# Patient Record
Sex: MALE | Race: WHITE | NOT HISPANIC OR LATINO | Employment: FULL TIME | URBAN - METROPOLITAN AREA
[De-identification: names, ages, dates, MRNs, and addresses within clinical notes are randomized per-mention and may not be internally consistent; named-entity substitution may affect disease eponyms.]

---

## 2017-03-06 ENCOUNTER — OFFICE VISIT - HEALTHEAST (OUTPATIENT)
Dept: FAMILY MEDICINE | Facility: CLINIC | Age: 47
End: 2017-03-06

## 2017-03-06 DIAGNOSIS — M79.89 FOOT SWELLING: ICD-10-CM

## 2017-03-06 DIAGNOSIS — E66.9 OBESITY, UNSPECIFIED: ICD-10-CM

## 2017-03-08 ENCOUNTER — COMMUNICATION - HEALTHEAST (OUTPATIENT)
Dept: FAMILY MEDICINE | Facility: CLINIC | Age: 47
End: 2017-03-08

## 2017-07-27 ENCOUNTER — COMMUNICATION - HEALTHEAST (OUTPATIENT)
Dept: FAMILY MEDICINE | Facility: CLINIC | Age: 47
End: 2017-07-27

## 2017-07-27 ENCOUNTER — RECORDS - HEALTHEAST (OUTPATIENT)
Dept: ADMINISTRATIVE | Facility: OTHER | Age: 47
End: 2017-07-27

## 2017-07-31 ENCOUNTER — COMMUNICATION - HEALTHEAST (OUTPATIENT)
Dept: TELEHEALTH | Facility: CLINIC | Age: 47
End: 2017-07-31

## 2017-07-31 ENCOUNTER — HOSPITAL ENCOUNTER (OUTPATIENT)
Dept: RADIOLOGY | Facility: CLINIC | Age: 47
Discharge: HOME | End: 2017-07-31

## 2017-07-31 DIAGNOSIS — M99.05 PELVIC REGION SOMATIC DYSFUNCTION: ICD-10-CM

## 2017-07-31 DIAGNOSIS — M54.2 CERVICALGIA: ICD-10-CM

## 2017-07-31 DIAGNOSIS — M99.02 SEGMENTAL AND SOMATIC DYSFUNCTION OF THORACIC REGION: ICD-10-CM

## 2017-07-31 DIAGNOSIS — M54.50 LUMBAGO: ICD-10-CM

## 2017-07-31 DIAGNOSIS — R51.9 FACIAL PAIN: ICD-10-CM

## 2017-07-31 DIAGNOSIS — M54.6 PAIN IN THORACIC SPINE: ICD-10-CM

## 2017-07-31 DIAGNOSIS — M99.01 CERVICAL (NECK) REGION SOMATIC DYSFUNCTION: ICD-10-CM

## 2017-07-31 DIAGNOSIS — M62.830 BACK MUSCLE SPASM: ICD-10-CM

## 2017-07-31 DIAGNOSIS — M99.03 LUMBAR REGION SOMATIC DYSFUNCTION: ICD-10-CM

## 2017-07-31 DIAGNOSIS — R42 DIZZINESS AND GIDDINESS: ICD-10-CM

## 2017-11-03 ENCOUNTER — OFFICE VISIT - HEALTHEAST (OUTPATIENT)
Dept: FAMILY MEDICINE | Facility: CLINIC | Age: 47
End: 2017-11-03

## 2017-11-03 ENCOUNTER — COMMUNICATION - HEALTHEAST (OUTPATIENT)
Dept: FAMILY MEDICINE | Facility: CLINIC | Age: 47
End: 2017-11-03

## 2017-11-03 ENCOUNTER — AMBULATORY - HEALTHEAST (OUTPATIENT)
Dept: FAMILY MEDICINE | Facility: CLINIC | Age: 47
End: 2017-11-03

## 2017-11-03 DIAGNOSIS — R73.03 PREDIABETES: ICD-10-CM

## 2017-11-03 DIAGNOSIS — M26.609 TEMPOROMANDIBULAR JOINT DISORDER: ICD-10-CM

## 2017-11-03 DIAGNOSIS — M19.90 OSTEOARTHRITIS: ICD-10-CM

## 2017-11-03 DIAGNOSIS — E78.5 HYPERLIPIDEMIA: ICD-10-CM

## 2017-11-03 DIAGNOSIS — M26.629 TMJ ARTHRALGIA: ICD-10-CM

## 2017-11-03 DIAGNOSIS — K21.00 REFLUX ESOPHAGITIS: ICD-10-CM

## 2017-11-03 LAB
CHOLEST SERPL-MCNC: 205 MG/DL
FASTING STATUS PATIENT QL REPORTED: YES
HDLC SERPL-MCNC: 34 MG/DL
LDLC SERPL CALC-MCNC: 147 MG/DL
TRIGL SERPL-MCNC: 121 MG/DL

## 2017-11-20 ENCOUNTER — AMBULATORY - HEALTHEAST (OUTPATIENT)
Dept: ADMINISTRATIVE | Facility: REHABILITATION | Age: 47
End: 2017-11-20

## 2017-11-20 ENCOUNTER — RECORDS - HEALTHEAST (OUTPATIENT)
Dept: ADMINISTRATIVE | Facility: OTHER | Age: 47
End: 2017-11-20

## 2017-11-20 ENCOUNTER — COMMUNICATION - HEALTHEAST (OUTPATIENT)
Dept: FAMILY MEDICINE | Facility: CLINIC | Age: 47
End: 2017-11-20

## 2017-11-20 DIAGNOSIS — M25.569 KNEE PAIN: ICD-10-CM

## 2017-11-20 DIAGNOSIS — G89.29 CHRONIC HEADACHES: ICD-10-CM

## 2017-11-20 DIAGNOSIS — S03.00XA TMJ (DISLOCATION OF TEMPOROMANDIBULAR JOINT): ICD-10-CM

## 2017-11-20 DIAGNOSIS — R51.9 CHRONIC HEADACHES: ICD-10-CM

## 2017-11-20 DIAGNOSIS — M17.9 KNEE OSTEOARTHRITIS: ICD-10-CM

## 2017-11-21 ENCOUNTER — RECORDS - HEALTHEAST (OUTPATIENT)
Dept: ADMINISTRATIVE | Facility: OTHER | Age: 47
End: 2017-11-21

## 2017-11-27 ENCOUNTER — OFFICE VISIT - HEALTHEAST (OUTPATIENT)
Dept: PHYSICAL THERAPY | Facility: REHABILITATION | Age: 47
End: 2017-11-27

## 2017-11-27 DIAGNOSIS — M25.561 CHRONIC PAIN OF RIGHT KNEE: ICD-10-CM

## 2017-11-27 DIAGNOSIS — G89.29 CHRONIC PAIN OF RIGHT KNEE: ICD-10-CM

## 2017-11-27 DIAGNOSIS — G44.221 CHRONIC TENSION-TYPE HEADACHE, INTRACTABLE: ICD-10-CM

## 2017-11-27 DIAGNOSIS — M26.629 TMJ PAIN DYSFUNCTION SYNDROME: ICD-10-CM

## 2017-12-07 ENCOUNTER — OFFICE VISIT - HEALTHEAST (OUTPATIENT)
Dept: PHYSICAL THERAPY | Facility: REHABILITATION | Age: 47
End: 2017-12-07

## 2017-12-07 DIAGNOSIS — M26.629 TMJ PAIN DYSFUNCTION SYNDROME: ICD-10-CM

## 2017-12-07 DIAGNOSIS — G89.29 CHRONIC PAIN OF RIGHT KNEE: ICD-10-CM

## 2017-12-07 DIAGNOSIS — M25.561 CHRONIC PAIN OF RIGHT KNEE: ICD-10-CM

## 2017-12-07 DIAGNOSIS — G44.221 CHRONIC TENSION-TYPE HEADACHE, INTRACTABLE: ICD-10-CM

## 2017-12-08 ENCOUNTER — OFFICE VISIT - HEALTHEAST (OUTPATIENT)
Dept: PHYSICAL THERAPY | Facility: REHABILITATION | Age: 47
End: 2017-12-08

## 2017-12-08 DIAGNOSIS — G44.221 CHRONIC TENSION-TYPE HEADACHE, INTRACTABLE: ICD-10-CM

## 2017-12-08 DIAGNOSIS — M25.561 CHRONIC PAIN OF RIGHT KNEE: ICD-10-CM

## 2017-12-08 DIAGNOSIS — G89.29 CHRONIC PAIN OF RIGHT KNEE: ICD-10-CM

## 2017-12-08 DIAGNOSIS — M26.629 TMJ PAIN DYSFUNCTION SYNDROME: ICD-10-CM

## 2017-12-13 ENCOUNTER — OFFICE VISIT - HEALTHEAST (OUTPATIENT)
Dept: PHYSICAL THERAPY | Facility: REHABILITATION | Age: 47
End: 2017-12-13

## 2017-12-13 DIAGNOSIS — G89.29 CHRONIC PAIN OF RIGHT KNEE: ICD-10-CM

## 2017-12-13 DIAGNOSIS — M25.561 CHRONIC PAIN OF RIGHT KNEE: ICD-10-CM

## 2017-12-13 DIAGNOSIS — G44.221 CHRONIC TENSION-TYPE HEADACHE, INTRACTABLE: ICD-10-CM

## 2017-12-13 DIAGNOSIS — M26.629 TMJ PAIN DYSFUNCTION SYNDROME: ICD-10-CM

## 2017-12-15 ENCOUNTER — OFFICE VISIT - HEALTHEAST (OUTPATIENT)
Dept: PHYSICAL THERAPY | Facility: REHABILITATION | Age: 47
End: 2017-12-15

## 2017-12-15 DIAGNOSIS — G89.29 CHRONIC PAIN OF RIGHT KNEE: ICD-10-CM

## 2017-12-15 DIAGNOSIS — G44.221 CHRONIC TENSION-TYPE HEADACHE, INTRACTABLE: ICD-10-CM

## 2017-12-15 DIAGNOSIS — M25.561 CHRONIC PAIN OF RIGHT KNEE: ICD-10-CM

## 2017-12-15 DIAGNOSIS — M26.629 TMJ PAIN DYSFUNCTION SYNDROME: ICD-10-CM

## 2017-12-21 ENCOUNTER — OFFICE VISIT - HEALTHEAST (OUTPATIENT)
Dept: PHYSICAL THERAPY | Facility: REHABILITATION | Age: 47
End: 2017-12-21

## 2017-12-21 DIAGNOSIS — G89.29 CHRONIC PAIN OF RIGHT KNEE: ICD-10-CM

## 2017-12-21 DIAGNOSIS — M26.629 TMJ PAIN DYSFUNCTION SYNDROME: ICD-10-CM

## 2017-12-21 DIAGNOSIS — G44.221 CHRONIC TENSION-TYPE HEADACHE, INTRACTABLE: ICD-10-CM

## 2017-12-21 DIAGNOSIS — M25.561 CHRONIC PAIN OF RIGHT KNEE: ICD-10-CM

## 2017-12-26 ENCOUNTER — RECORDS - HEALTHEAST (OUTPATIENT)
Dept: ADMINISTRATIVE | Facility: OTHER | Age: 47
End: 2017-12-26

## 2017-12-26 ENCOUNTER — OFFICE VISIT - HEALTHEAST (OUTPATIENT)
Dept: PHYSICAL THERAPY | Facility: REHABILITATION | Age: 47
End: 2017-12-26

## 2017-12-26 DIAGNOSIS — M26.629 TMJ PAIN DYSFUNCTION SYNDROME: ICD-10-CM

## 2017-12-26 DIAGNOSIS — G89.29 CHRONIC PAIN OF RIGHT KNEE: ICD-10-CM

## 2017-12-26 DIAGNOSIS — G44.221 CHRONIC TENSION-TYPE HEADACHE, INTRACTABLE: ICD-10-CM

## 2017-12-26 DIAGNOSIS — M25.561 CHRONIC PAIN OF RIGHT KNEE: ICD-10-CM

## 2017-12-27 ENCOUNTER — OFFICE VISIT - HEALTHEAST (OUTPATIENT)
Dept: PHYSICAL THERAPY | Facility: REHABILITATION | Age: 47
End: 2017-12-27

## 2017-12-27 DIAGNOSIS — M26.629 TMJ PAIN DYSFUNCTION SYNDROME: ICD-10-CM

## 2017-12-27 DIAGNOSIS — M25.561 CHRONIC PAIN OF RIGHT KNEE: ICD-10-CM

## 2017-12-27 DIAGNOSIS — G44.221 CHRONIC TENSION-TYPE HEADACHE, INTRACTABLE: ICD-10-CM

## 2017-12-27 DIAGNOSIS — G89.29 CHRONIC PAIN OF RIGHT KNEE: ICD-10-CM

## 2018-01-08 ENCOUNTER — OFFICE VISIT - HEALTHEAST (OUTPATIENT)
Dept: PHYSICAL THERAPY | Facility: REHABILITATION | Age: 48
End: 2018-01-08

## 2018-01-08 DIAGNOSIS — G89.29 CHRONIC PAIN OF RIGHT KNEE: ICD-10-CM

## 2018-01-08 DIAGNOSIS — M26.629 TMJ PAIN DYSFUNCTION SYNDROME: ICD-10-CM

## 2018-01-08 DIAGNOSIS — G44.221 CHRONIC TENSION-TYPE HEADACHE, INTRACTABLE: ICD-10-CM

## 2018-01-08 DIAGNOSIS — M25.561 CHRONIC PAIN OF RIGHT KNEE: ICD-10-CM

## 2018-01-12 ENCOUNTER — OFFICE VISIT (OUTPATIENT)
Dept: OPHTHALMOLOGY | Facility: CLINIC | Age: 48
End: 2018-01-12
Payer: COMMERCIAL

## 2018-01-12 DIAGNOSIS — H52.03 HYPERMETROPIA OF BOTH EYES: Primary | ICD-10-CM

## 2018-01-12 DIAGNOSIS — R73.03 PREDIABETES: ICD-10-CM

## 2018-01-12 RX ORDER — ATORVASTATIN CALCIUM 80 MG/1
80 TABLET, FILM COATED ORAL
Status: ON HOLD | COMMUNITY
Start: 2017-11-03 | End: 2022-10-28

## 2018-01-12 ASSESSMENT — REFRACTION_WEARINGRX
OD_SPHERE: +1.00
OS_ADD: +2.00
OS_SPHERE: +1.00
OD_CYLINDER: SPHERE
OS_CYLINDER: SPHERE
OD_ADD: +2.00
SPECS_TYPE: PAL

## 2018-01-12 ASSESSMENT — TONOMETRY
OD_IOP_MMHG: 12
IOP_METHOD: ICARE
OS_IOP_MMHG: 15

## 2018-01-12 ASSESSMENT — VISUAL ACUITY
OD_CC: 20/20
OS_CC: 20/20
METHOD: SNELLEN - LINEAR
OD_CC: 20/20
CORRECTION_TYPE: GLASSES
OS_CC: 20/40

## 2018-01-12 ASSESSMENT — CUP TO DISC RATIO
OD_RATIO: 0.6
OS_RATIO: 0.6

## 2018-01-12 ASSESSMENT — REFRACTION_MANIFEST
OD_ADD: +2.00
OD_CYLINDER: +0.25
OS_SPHERE: +1.00
OS_ADD: +2.00
OS_CYLINDER: SPHERE
OD_AXIS: 060
OD_SPHERE: +0.75

## 2018-01-12 ASSESSMENT — CONF VISUAL FIELD
OS_NORMAL: 1
OD_NORMAL: 1

## 2018-01-12 ASSESSMENT — EXTERNAL EXAM - RIGHT EYE: OD_EXAM: NORMAL

## 2018-01-12 ASSESSMENT — SLIT LAMP EXAM - LIDS
COMMENTS: NORMAL
COMMENTS: NORMAL

## 2018-01-12 ASSESSMENT — EXTERNAL EXAM - LEFT EYE: OS_EXAM: NORMAL

## 2018-01-12 NOTE — PROGRESS NOTES
Assessment/Plan  (H52.03) Hypermetropia of both eyes  (primary encounter diagnosis)  Comment: Hyperopia with presbyopia OU  Plan: REFRACTION [00612]         Educated patient on condition and clinical findings. Dispensed spectacle prescription for full time wear. Educated patient on possibility of adaptation period, if symptoms do not improve return to clinic for further testing.   The patient may choose to return for contact lens fitting. Discussed lenses and fees. Would need I&R training.    (R73.03) Prediabetes  Comment: No retinopathy OU  Plan:  Educated patient on clinical findings and the importance of continued management with primary care physician. Continue management as directed and return to clinic in 1 year for dilated exam, or sooner, as needed.    Return to clinic in 1 year for comprehensive eye exam.    Complete documentation of historical and exam elements from today's encounter can  be found in the full encounter summary report (not reduplicated in this progress  note). I personally obtained the chief complaint(s) and history of present illness. I  confirmed and edited as necessary the review of systems, past medical/surgical  history, family history, social history, and examination findings as documented by  others; and I examined the patient myself. I personally reviewed the relevant tests,  images, and reports as documented above. I formulated and edited as necessary the  assessment and plan and discussed the findings and management plan with the  patient and family.    Olegario Gabriel OD, FAAO

## 2018-01-12 NOTE — MR AVS SNAPSHOT
After Visit Summary   1/12/2018    Aurelio Batista    MRN: [id number]           Patient Information     Date Of Birth          1970        Visit Information        Provider Department      1/12/2018 10:00 AM Olegario Gabriel, JOANN [facility]        Today's Diagnoses     Hypermetropia of both eyes    -  1    Prediabetes           Follow-ups after your visit        Follow-up notes from your care team     Return in about 1 year (around 1/12/2019) for Comprehensive Eye Exam.      Who to contact     Please call your clinic at [phone] to:    Ask questions about your health    Make or cancel appointments    Discuss your medicines    Learn about your test results    Speak to your doctor   If you have compliments or concerns about an experience at your clinic, or if you wish to file a complaint, please contact [facility] Patient Relations at [phone] or email us at [email or web address]         Additional Information About Your Visit        Care EveryWhere ID     This is your Care EveryWhere ID. This could be used by other organizations to access your [place] medical records  [id number]         Blood Pressure from Last 3 Encounters:   No data found for BP    Weight from Last 3 Encounters:   No data found for Wt              We Performed the Following     REFRACTION [05094]        Primary Care Provider Office Phone # Fax #    Jet Greenfield [phone]       [facility] [address] [place] [address]   [facility] [address]        Equal Access to Services     JIGNESH PLASENCIA : Hadii aad ku hadasho Soomaali, waaxda luqadaha, qaybta kaalmada bryegyada, driss sneed. So [facility] [phone].    ATENCIÓN: Si habla español, tiene a frey disposición servicios gratuitos de asistencia lingüística. James al [phone].    We comply with applicable federal civil rights laws and [place] laws. We do not discriminate on the basis  of race, color, national origin, age, disability, sex, sexual orientation, or gender identity.            Thank you!     Thank you for choosing [facility] OPHTHALMOLOGY  for your care. Our goal is always to provide you with excellent care. Hearing back from our patients is one way we can continue to improve our services. Please take a few minutes to complete the written survey that you may receive in the mail after your visit with us. Thank you!             Your Updated Medication List - Protect others around you: Learn how to safely use, store and throw away your medicines at www.disposemymeds.org.          This list is accurate as of: 1/12/18  3:36 PM.  Always use your most recent med list.                   Brand Name Dispense Instructions for use Diagnosis    atorvastatin 80 MG tablet    LIPITOR     Take 80 mg by mouth        omeprazole 20 MG CR capsule    priLOSEC     Take 20 mg by mouth

## 2018-01-26 ENCOUNTER — OFFICE VISIT (OUTPATIENT)
Dept: OPHTHALMOLOGY | Facility: CLINIC | Age: 48
End: 2018-01-26
Payer: COMMERCIAL

## 2018-01-26 ENCOUNTER — APPOINTMENT (OUTPATIENT)
Dept: OPHTHALMOLOGY | Facility: CLINIC | Age: 48
End: 2018-01-26

## 2018-01-26 DIAGNOSIS — H52.4 PRESBYOPIA: Primary | ICD-10-CM

## 2018-01-26 ASSESSMENT — REFRACTION_CURRENTRX
OS_ADD: MED
OS_BRAND: AIR OPTIX AQUA MULTIFOCAL
OS_DIAMETER: 14.2
OD_BRAND: AIR OPTIX AQUA MULTIFOCAL
OD_DIAMETER: 14.2
OD_ADD: MED
OD_SPHERE: +0.75
OS_BASECURVE: 8.6
OD_BASECURVE: 8.6
OS_SPHERE: +1.00

## 2018-01-26 ASSESSMENT — TONOMETRY
IOP_METHOD: ICARE
OS_IOP_MMHG: 16
OD_IOP_MMHG: 15

## 2018-01-26 ASSESSMENT — VISUAL ACUITY
OS_CC: 20/15
OD_CC: 20/15
METHOD: SNELLEN - LINEAR
CORRECTION_TYPE: GLASSES

## 2018-01-26 ASSESSMENT — CONF VISUAL FIELD
OD_NORMAL: 1
OS_NORMAL: 1

## 2018-01-26 NOTE — NURSING NOTE
Chief Complaints and History of Present Illnesses   Patient presents with     New Eval For Contact Lens     CL     HPI    Affected eye(s):  Both   Symptoms:     No blurred vision      Frequency:  Constant       Do you have eye pain now?:  No      Comments:  Interested in CLs. Has tried a long time ago but did not work at the time.     Patient states vision has been stable since last eye exam, both eyes. Denies eye pain or irritation. Does not take eye drops.    Maggy Davis COT 9:12 AM January 26, 2018

## 2018-01-26 NOTE — MR AVS SNAPSHOT
After Visit Summary   1/26/2018    Aurelio Batista    MRN: [id number]           Patient Information     Date Of Birth          1970        Visit Information        Provider Department      1/26/2018 9:20 AM Olegario Gabriel OD [facility]        Today's Diagnoses     Presbyopia    -  1       Follow-ups after your visit        Follow-up notes from your care team     Return in about 2 weeks (around 2/9/2018) for Contact Lens Follow-up.      Your next 10 appointments already scheduled     Feb 13, 2018 11:40 AM CST   (Arrive by 11:25 AM)   RETURN GENERAL with JOANN [facility] ([facility])    [address] [facility]  4th [facility] [address] [phone]              Who to contact     Please call your clinic at [phone] to:    Ask questions about your health    Make or cancel appointments    Discuss your medicines    Learn about your test results    Speak to your doctor   If you have compliments or concerns about an experience at your clinic, or if you wish to file a complaint, please contact [facility] Patient Relations at [phone] or email us at [email or web address]         Additional Information About Your Visit        Care EveryWhere ID     This is your Care EveryWhere ID. This could be used by other organizations to access your [place] medical records  [id number]         Blood Pressure from Last 3 Encounters:   No data found for BP    Weight from Last 3 Encounters:   No data found for Wt              We Performed the Following     HC CONTACT LENS FITTING COSMETIC LVL 3 (20227.818)        Primary Care Provider Office Phone # Fax #    Jet Greenfield [phone]       [facility] [address] [place] [address] [address] [place] [address]        Equal Access to Services     JIGNESH PLASENCIA AH: mirna Gregory qaybta kaalmada adeegyada, waxay idiin  lyela magañaapulo larosettamelvin nedra. So [facility] [phone].    ATENCIÓN: Si habla albertañol, tiene a frey disposición servicios gratuitos de asistencia lingüística. James al [phone].    We comply with applicable federal civil rights laws and [place] laws. We do not discriminate on the basis of race, color, national origin, age, disability, sex, sexual orientation, or gender identity.            Thank you!     Thank you for choosing [facility] OPHTHALMOLOGY  for your care. Our goal is always to provide you with excellent care. Hearing back from our patients is one way we can continue to improve our services. Please take a few minutes to complete the written survey that you may receive in the mail after your visit with us. Thank you!             Your Updated Medication List - Protect others around you: Learn how to safely use, store and throw away your medicines at www.disposemymeds.org.          This list is accurate as of 1/26/18 11:59 PM.  Always use your most recent med list.                   Brand Name Dispense Instructions for use Diagnosis    atorvastatin 80 MG tablet    LIPITOR     Take 80 mg by mouth        omeprazole 20 MG CR capsule    priLOSEC     Take 20 mg by mouth

## 2018-01-26 NOTE — PROGRESS NOTES
Assessment/Plan  (H52.4) Presbyopia  (primary encounter diagnosis)  Comment: First time multi-focal, OS Dominant, final OR not performed today  Plan: HC CONTACT LENS FITTING COSMETIC LVL 3 (50976.013)         Insertion and removal training completed successfully. Dispensed trial lenses. Patient to return to clinic in 2 weeks for follow-up. Over-refraction to be checked at follow-up. Patient prioritizes distance vision, so near vision may be slightly decreased.    Contact Lens Billing  V-Code:  - Soft spherical; multifocal     CL Fitting Fee: $125    These are for cosmetic contact lenses.    Encounter Diagnosis   Name Primary?     Presbyopia Yes      Complete documentation of historical and exam elements from today's encounter can  be found in the full encounter summary report (not reduplicated in this progress  note). I personally obtained the chief complaint(s) and history of present illness. I  confirmed and edited as necessary the review of systems, past medical/surgical  history, family history, social history, and examination findings as documented by  others; and I examined the patient myself. I personally reviewed the relevant tests,  images, and reports as documented above. I formulated and edited as necessary the  assessment and plan and discussed the findings and management plan with the  patient and family.    Olegario Gabriel, JOANN, FAAO

## 2018-02-20 ENCOUNTER — OFFICE VISIT (OUTPATIENT)
Dept: OPHTHALMOLOGY | Facility: CLINIC | Age: 48
End: 2018-02-20
Payer: COMMERCIAL

## 2018-02-20 DIAGNOSIS — H52.03 HYPERMETROPIA OF BOTH EYES: Primary | ICD-10-CM

## 2018-02-20 ASSESSMENT — VISUAL ACUITY
CORRECTION_TYPE: GLASSES
OS_CC: 20/15
OS_CC+: -
OD_CC+: -
METHOD: SNELLEN - LINEAR
OD_CC: 20/15

## 2018-02-20 NOTE — MR AVS SNAPSHOT
After Visit Summary   2/20/2018    Aurelio Batista    MRN: [id number]           Patient Information     Date Of Birth          1970        Visit Information        Provider Department      2/20/2018 1:20 PM Olegario Gabriel OD [facility]         Follow-ups after your visit        Your next 10 appointments already scheduled     Feb 28, 2018  2:40 PM CST   (Arrive by 2:25 PM)   RETURN GENERAL with JOANN [facility] ([facility] [place])    [address] [place] [address]  4th [facility] [address] [phone]              Who to contact     Please call your clinic at [phone] to:    Ask questions about your health    Make or cancel appointments    Discuss your medicines    Learn about your test results    Speak to your doctor            Additional Information About Your Visit        Care EveryWhere ID     This is your Care EveryWhere ID. This could be used by other organizations to access your [place] medical records  [id number]         Blood Pressure from Last 3 Encounters:   No data found for BP    Weight from Last 3 Encounters:   No data found for Wt              Today, you had the following     No orders found for display       Primary Care Provider Office Phone # Fax #    Jet TOBI Greenfield [phone]       [facility] [address] [place] [address] [facility] [address]        Equal Access to Services     JIGNESH PLASENCIA : Hadii aad ku hadasho Soomaali, waaxda luqadaha, qaybta kaalmada adeegyada, waxay tripin hayyurin benito sneed. So [facility] [phone].    ATENCIÓN: Si habla español, tiene a frey disposición servicios gratuitos de asistencia lingüística. Llrobbi al [phone].    We comply with applicable federal civil rights laws and [place] laws. We do not discriminate on the basis of race, color, national origin, age, disability, sex, sexual orientation, or gender identity.            Thank you!     Thank you for  choosing [facility] OPHTHALMOLOGY  for your care. Our goal is always to provide you with excellent care. Hearing back from our patients is one way we can continue to improve our services. Please take a few minutes to complete the written survey that you may receive in the mail after your visit with us. Thank you!             Your Updated Medication List - Protect others around you: Learn how to safely use, store and throw away your medicines at www.disposemymeds.org.          This list is accurate as of 2/20/18  2:02 PM.  Always use your most recent med list.                   Brand Name Dispense Instructions for use Diagnosis    atorvastatin 80 MG tablet    LIPITOR     Take 80 mg by mouth        omeprazole 20 MG CR capsule    priLOSEC     Take 20 mg by mouth

## 2018-02-20 NOTE — NURSING NOTE
Chief Complaints and History of Present Illnesses   Patient presents with     Follow Up For     CL     HPI    Affected eye(s):  Both   Symptoms:     No blurred vision      Frequency:  Constant       Do you have eye pain now?:  No      Comments:  Having difficulty getting CLs in and out.     Vision in CLs is about the same as with glasses, so working well.       Maggy Davis COT 1:27 PM February 20, 2018

## 2018-02-20 NOTE — PROGRESS NOTES
Assessment/Plan  (H52.03) Hypermetropia of both eyes  (primary encounter diagnosis)  Comment: Reporting difficulty with insertion  Plan:  Retrained patient on insertion/removal. Return to clinic in 1 week for contact lens follow-up. Discuss single vision distance lenses as an alternative.    This visit billed as no-charge contact lens follow-up.    Complete documentation of historical and exam elements from today's encounter can  be found in the full encounter summary report (not reduplicated in this progress  note). I personally obtained the chief complaint(s) and history of present illness. I  confirmed and edited as necessary the review of systems, past medical/surgical  history, family history, social history, and examination findings as documented by  others; and I examined the patient myself. I personally reviewed the relevant tests,  images, and reports as documented above. I formulated and edited as necessary the  assessment and plan and discussed the findings and management plan with the  patient and family.    Olegario Gabriel, OD, FAAO

## 2018-02-28 ENCOUNTER — OFFICE VISIT (OUTPATIENT)
Dept: OPHTHALMOLOGY | Facility: CLINIC | Age: 48
End: 2018-02-28
Payer: COMMERCIAL

## 2018-02-28 DIAGNOSIS — H52.03 HYPERMETROPIA OF BOTH EYES: Primary | ICD-10-CM

## 2018-02-28 ASSESSMENT — REFRACTION_CURRENTRX
OD_SPHERE: +0.75
OD_BRAND: AIR OPTIX AQUA MULTIFOCAL
OS_ADD: MED
OS_BASECURVE: 8.6
OS_BRAND: AIR OPTIX AQUA MULTIFOCAL
OS_SPHERE: +1.00
OD_DIAMETER: 14.2
OS_DIAMETER: 14.2
OD_BASECURVE: 8.6
OD_ADD: MED

## 2018-02-28 ASSESSMENT — VISUAL ACUITY
OD_CC: 20/20
METHOD: SNELLEN - LINEAR
OS_CC: 20/20
CORRECTION_TYPE: CONTACTS

## 2018-02-28 NOTE — PROGRESS NOTES
Assessment/Plan  (H52.03) Hypermetropia of both eyes  (primary encounter diagnosis)  Comment: Reports acceptable distance and near vision, good comfort  Plan:  Dispensed finalized contact lens prescription for 2 years.    Patient would prefer to order online.    This visit billed as no-charge contact lens follow-up.    Complete documentation of historical and exam elements from today's encounter can  be found in the full encounter summary report (not reduplicated in this progress  note). I personally obtained the chief complaint(s) and history of present illness. I  confirmed and edited as necessary the review of systems, past medical/surgical  history, family history, social history, and examination findings as documented by  others; and I examined the patient myself. I personally reviewed the relevant tests,  images, and reports as documented above. I formulated and edited as necessary the  assessment and plan and discussed the findings and management plan with the  patient and family.    Olegario Gabriel OD, FAAO

## 2018-02-28 NOTE — MR AVS SNAPSHOT
After Visit Summary   2/28/2018    Aurelio Batista    MRN: [id number]           Patient Information     Date Of Birth          1970        Visit Information        Provider Department      2/28/2018 2:40 PM Olegario Gabriel, OD [facility]        Today's Diagnoses     Hypermetropia of both eyes    -  1       Follow-ups after your visit        Follow-up notes from your care team     Return in about 1 year (around 2/28/2019) for Comprehensive Eye Exam.      Who to contact     Please call your clinic at [phone] to:    Ask questions about your health    Make or cancel appointments    Discuss your medicines    Learn about your test results    Speak to your doctor            Additional Information About Your Visit        Care EveryWhere ID     This is your Care EveryWhere ID. This could be used by other organizations to access your [place] medical records  [id number]         Blood Pressure from Last 3 Encounters:   No data found for BP    Weight from Last 3 Encounters:   No data found for Wt              Today, you had the following     No orders found for display       Primary Care Provider Office Phone # Fax #    Jet Greenfield [phone]       [facility] [address] [place] [address]   [facility] [address]        Equal Access to Services     [facility]: Hadii aad ku hadasho Soomaali, waaxda luqadaha, qaybta kaalmada adeegyada, driss angel . So [facility] [phone].    ATENCIÓN: Si habla español, tiene a frey disposición servicios gratuitos de asistencia lingüística. Llame al [phone].    We comply with applicable federal civil rights laws and [place] laws. We do not discriminate on the basis of race, color, national origin, age, disability, sex, sexual orientation, or gender identity.            Thank you!     Thank you for choosing [facility] OPHTHALMOLOGY  for your care. Our goal is always to provide you with excellent care. Hearing back  from our patients is one way we can continue to improve our services. Please take a few minutes to complete the written survey that you may receive in the mail after your visit with us. Thank you!             Your Updated Medication List - Protect others around you: Learn how to safely use, store and throw away your medicines at www.disposemymeds.org.          This list is accurate as of 2/28/18 11:59 PM.  Always use your most recent med list.                   Brand Name Dispense Instructions for use Diagnosis    atorvastatin 80 MG tablet    LIPITOR     Take 80 mg by mouth        omeprazole 20 MG CR capsule    priLOSEC     Take 20 mg by mouth

## 2018-10-26 ENCOUNTER — OFFICE VISIT (OUTPATIENT)
Dept: OPHTHALMOLOGY | Facility: CLINIC | Age: 48
End: 2018-10-26
Payer: COMMERCIAL

## 2018-10-26 DIAGNOSIS — H52.03 HYPERMETROPIA OF BOTH EYES: Primary | ICD-10-CM

## 2018-10-26 ASSESSMENT — CONF VISUAL FIELD
OS_NORMAL: 1
METHOD: COUNTING FINGERS
OD_NORMAL: 1

## 2018-10-26 ASSESSMENT — REFRACTION_WEARINGRX
OD_CYLINDER: SPHERE
OS_ADD: +2.00
OD_ADD: +2.00
OS_SPHERE: +1.00
OS_CYLINDER: SPHERE
OD_SPHERE: +1.00
SPECS_TYPE: PAL

## 2018-10-26 ASSESSMENT — REFRACTION_MANIFEST
OD_SPHERE: +0.75
OS_SPHERE: +1.00
OS_CYLINDER: SPHERE
OD_CYLINDER: +0.25
OD_AXIS: 045

## 2018-10-26 ASSESSMENT — EXTERNAL EXAM - LEFT EYE: OS_EXAM: NORMAL

## 2018-10-26 ASSESSMENT — VISUAL ACUITY
OS_CC: 20/20
METHOD: SNELLEN - LINEAR
OD_CC+: -1
OD_CC: 20/20
CORRECTION_TYPE: GLASSES

## 2018-10-26 ASSESSMENT — SLIT LAMP EXAM - LIDS
COMMENTS: BLEPHARITIS
COMMENTS: BLEPHARITIS

## 2018-10-26 ASSESSMENT — EXTERNAL EXAM - RIGHT EYE: OD_EXAM: NORMAL

## 2018-10-26 NOTE — MR AVS SNAPSHOT
After Visit Summary   10/26/2018    Aurelio Batista    MRN: [id number]           Patient Information     Date Of Birth          1970        Visit Information        Provider Department      10/26/2018 8:20 AM Oleg[facility] OD [facility]        Today's Diagnoses     Hypermetropia of both eyes    -  1       Follow-ups after your visit        Your next 10 appointments already scheduled     Oct 26, 2018  8:20 AM CDT   (Arrive by 8:05 AM)   RETURN GENERAL with [facility]ANN [facility] ([facility] [place])    [address] [place] [address] [phone]              Who to contact     Please call your clinic at [phone] to:    Ask questions about your health    Make or cancel appointments    Discuss your medicines    Learn about your test results    Speak to your doctor            Additional Information About Your Visit        MyChart Information     [employer] is an electronic gateway that provides easy, online access to your medical records. With [employer], you can request a clinic appointment, read your test results, renew a prescription or communicate with your care team.     To sign up for [employer] visit the website at [email or web address]   You will be asked to enter the access code listed below, as well as some personal information. Please follow the directions to create your username and password.     Your access code is: [email or web address]  Expires: 2019  6:30 AM     Your access code will  in 90 days. If you need help or a new code, please contact your [facility] or call [phone] for assistance.        Care EveryWhere ID     This is your Care EveryWhere ID. This could be used by other organizations to access your [place] medical records  [id number]         Blood Pressure from Last 3 Encounters:   No data found for BP    Weight from Last 3 Encounters:   No data found for  Wt              Today, you had the following     No orders found for display       Primary Care Provider Office Phone # Fax #    Jet Greenfield [phone]       [facility] [address] [facility] [place] [address]        Equal Access to Services     BOGDAN ERINN : Hadii aad ku hadasho Soomaali, waaxda luqadaha, qaybta kaalmada adeegyada, waxay idiin hayaan adeeg khalbinash la'yurin ah. So [facility] [phone].    ATENCIÓN: Si habla español, tiene a frey disposición servicios gratuitos de asistencia lingüística. Llame al [phone].    We comply with applicable federal civil rights laws and [place] laws. We do not discriminate on the basis of race, color, national origin, age, disability, sex, sexual orientation, or gender identity.            Thank you!     Thank you for choosing [facility] OPHTHALMOLOGY  for your care. Our goal is always to provide you with excellent care. Hearing back from our patients is one way we can continue to improve our services. Please take a few minutes to complete the written survey that you may receive in the mail after your visit with us. Thank you!             Your Updated Medication List - Protect others around you: Learn how to safely use, store and throw away your medicines at www.disposemymeds.org.          This list is accurate as of 10/26/18  8:19 AM.  Always use your most recent med list.                   Brand Name Dispense Instructions for use Diagnosis    atorvastatin 80 MG tablet    LIPITOR     Take 80 mg by mouth        omeprazole 20 MG CR capsule    priLOSEC     Take 20 mg by mouth

## 2018-10-26 NOTE — PROGRESS NOTES
Assessment/Plan  (H52.03) Hypermetropia of both eyes  (primary encounter diagnosis)  Comment: Hyperopia, stable  Plan:  The patient is interested in a LASIK consultation. Has difficulty with contact lenses, would like distance both eyes, and wear reading glasses as needed.    Referred for LASIK consult with cornea department.    This visit billed as no-charge refraction recheck.    Complete documentation of historical and exam elements from today's encounter can  be found in the full encounter summary report (not reduplicated in this progress  note). I personally obtained the chief complaint(s) and history of present illness. I  confirmed and edited as necessary the review of systems, past medical/surgical  history, family history, social history, and examination findings as documented by  others; and I examined the patient myself. I personally reviewed the relevant tests,  images, and reports as documented above. I formulated and edited as necessary the  assessment and plan and discussed the findings and management plan with the  patient and family.    Olegario Gabriel OD, FAAO

## 2018-11-09 ENCOUNTER — OFFICE VISIT (OUTPATIENT)
Dept: OPHTHALMOLOGY | Facility: CLINIC | Age: 48
End: 2018-11-09
Attending: OPHTHALMOLOGY

## 2018-11-09 DIAGNOSIS — H52.203 HYPEROPIA OF BOTH EYES WITH ASTIGMATISM: Primary | ICD-10-CM

## 2018-11-09 DIAGNOSIS — H25.13 NUCLEAR SCLEROTIC CATARACT OF BOTH EYES: ICD-10-CM

## 2018-11-09 DIAGNOSIS — H52.03 HYPEROPIA OF BOTH EYES WITH ASTIGMATISM: Primary | ICD-10-CM

## 2018-11-09 PROCEDURE — 92015 DETERMINE REFRACTIVE STATE: CPT | Mod: ZF

## 2018-11-09 PROCEDURE — 92025 CPTRIZED CORNEAL TOPOGRAPHY: CPT | Mod: ZF | Performed by: OPHTHALMOLOGY

## 2018-11-09 PROCEDURE — G0463 HOSPITAL OUTPT CLINIC VISIT: HCPCS | Mod: ZF

## 2018-11-09 ASSESSMENT — REFRACTION_WEARINGRX
OS_CYLINDER: SPHERE
OD_ADD: +2.00
OD_CYLINDER: SPHERE
OD_SPHERE: +1.00
SPECS_TYPE: PAL
OS_SPHERE: +2.50
OD_CYLINDER: SPHERE
OS_CYLINDER: SPHERE
SPECS_TYPE: OTC
OD_SPHERE: +2.50
OS_ADD: +2.00
OS_SPHERE: +1.00

## 2018-11-09 ASSESSMENT — REFRACTION
OS_CYLINDER: +0.25
OD_SPHERE: +0.75
OD_AXIS: 035
OS_AXIS: 140
OS_SPHERE: +1.00
OD_CYLINDER: +0.50

## 2018-11-09 ASSESSMENT — REFRACTION_MANIFEST
OS_SPHERE: +1.00
OD_AXIS: 035
OD_CYLINDER: +0.50
OS_AXIS: 140
OD_SPHERE: +0.75
OS_CYLINDER: +0.25

## 2018-11-09 ASSESSMENT — CUP TO DISC RATIO
OS_RATIO: 0.3
OD_RATIO: 0.3

## 2018-11-09 ASSESSMENT — TONOMETRY
IOP_METHOD: ICARE
OD_IOP_MMHG: 14
OS_IOP_MMHG: 14

## 2018-11-09 ASSESSMENT — SLIT LAMP EXAM - LIDS
COMMENTS: NORMAL
COMMENTS: NORMAL

## 2018-11-09 ASSESSMENT — VISUAL ACUITY
METHOD: SNELLEN - LINEAR
OD_SC+: -1
OD_SC: 20/400
OS_CC: 20/20
METHOD: SNELLEN - LINEAR
OD_CC: J1+
OD_SC: 20/50
OD_CC: 20/20
OS_SC: 20/400
OS_CC: J1+
CORRECTION_TYPE: GLASSES
OS_SC: 20/40

## 2018-11-09 ASSESSMENT — EXTERNAL EXAM - LEFT EYE: OS_EXAM: NORMAL

## 2018-11-09 ASSESSMENT — PACHYMETRY
OD_CT(UM): 540
OS_CT(UM): 540

## 2018-11-09 ASSESSMENT — EXTERNAL EXAM - RIGHT EYE: OD_EXAM: NORMAL

## 2018-11-09 NOTE — MR AVS SNAPSHOT
After Visit Summary   2018    Aurelio Batista    MRN: [id number]           Patient Information     Date Of Birth          1970        Visit Information        Provider Department      2018 12:15 PM [facility]        Today's Diagnoses     Hyperopia of both eyes with astigmatism    -  1    Nuclear sclerotic cataract of both eyes           Follow-ups after your visit        Who to contact     Please call your clinic at [phone] to:    Ask questions about your health    Make or cancel appointments    Discuss your medicines    Learn about your test results    Speak to your doctor            Additional Information About Your Visit        MyChart Information     [employer] is an electronic gateway that provides easy, online access to your medical records. With [employer], you can request a clinic appointment, read your test results, renew a prescription or communicate with your care team.     To sign up for [employer] visit the website at [email or web address]   You will be asked to enter the access code listed below, as well as some personal information. Please follow the directions to create your username and password.     Your access code is: [email or web address]  Expires: 2019  5:30 AM     Your access code will  in 90 days. If you need help or a new code, please contact your [facility] or call [phone] for assistance.        Care EveryWhere ID     This is your Care EveryWhere ID. This could be used by other organizations to access your [facility] medical records  [id number]         Blood Pressure from Last 3 Encounters:   No data found for BP    Weight from Last 3 Encounters:   No data found for Wt              We Performed the Following     Corneal Topography OU (both eyes)        Primary Care Provider Office Phone # Fax #    Jet Greenfield [phone]       [facility] [address] [facility] [place]  [place] [address]        Equal Access to Services     [facility] ERINN : Hadii aad ku hadharriettgissel Shirleyali, watrinida luqadaha, qaybta kasoldriss bain. So [facility] [phone].    ATENCIÓN: Si habla español, tiene a frey disposición servicios gratuitos de asistencia lingüística. Llame al [phone].    We comply with applicable federal civil rights laws and [place] laws. We do not discriminate on the basis of race, color, national origin, age, disability, sex, sexual orientation, or gender identity.            Thank you!     Thank you for choosing EYE CLINIC  for your care. Our goal is always to provide you with excellent care. Hearing back from our patients is one way we can continue to improve our services. Please take a few minutes to complete the written survey that you may receive in the mail after your visit with us. Thank you!             Your Updated Medication List - Protect others around you: Learn how to safely use, store and throw away your medicines at www.disposemymeds.org.          This list is accurate as of 11/9/18  7:48 PM.  Always use your most recent med list.                   Brand Name Dispense Instructions for use Diagnosis    atorvastatin 80 MG tablet    LIPITOR     Take 80 mg by mouth        omeprazole 20 MG CR capsule    priLOSEC     Take 20 mg by mouth

## 2018-11-09 NOTE — NURSING NOTE
Chief Complaints and History of Present Illnesses   Patient presents with     Consult For     LASIK     HPI    Affected eye(s):  Both   Symptoms:     No blurred vision   Floaters   No flashes   No redness   No tearing      Duration:  2 weeks   Frequency:  Constant       Do you have eye pain now?:  No      Comments:  Patient presents for LASIK consultation with cornea ophthalmologist. He saw optometrist Dr. Gabriel 2wks ago and was rechecked for Hypermetropia of both eyes. Since then the vision has been stable. No pain or discomfort, no redness itching or tears. No flashes, 1 floater RE. No eye drops. No CLS. Wears glasses for distance, interested in LASIK. Alexia Acosta COT 12:15 PM November 9, 2018

## 2018-11-09 NOTE — PROGRESS NOTES
CC:  Lasik referral    HPI:  49 y/o  male here for LASIK eval.     Denies any eye pain, redness, burning, or itching.    Wants to get out of glasses due to problems with bumping the glasses and them falling off of his face all the time. 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    Denies any problems with glare, haloes, or night driving.     Occupation: Highway , ,     Currently using separate pair of reading glasses for near work.   Broke a pair of progressives. Had trouble finding the right part of the glasses to look through when using the computer.     Switches between readers and distance glasses all the time.    Has tried monovision in the past but felt his depth perception was inadequate and giving him trouble for driving. Tried contact lens fitting but was unable to put in and remove lenses appropriately.    Primary goal is to get rid of glasses for distance. He is ok with needing to use reading glasses PRN.    Had second opinion for LASIK surgery at [facility] earlier today- he wants to move forward with surgery at whichever place is more affordable.    POH:  No prior eye surgery/laser  Grandmother (paternal) - ?glaucoma  No prior infections    Gtts:  No gtts    PMH:  HLD on lipitor  prilosec for GERD  No DM  No hx of headaches, headache medication, no hx of amiodarone use    All:  Codeine - nausea and vomiting    A/P:  1. Hyperopia, each eye  -mild, approx +1.00 spherical equivalent each eye  -normal pachy  -cycloplegic and manifest in agreement  -pt reports stable refraction for the past 1 year  -ruth ann with regular astigmatism each eye, no thinning, normal IS value  -discussed that patient is a decent candidate but given age and tr cataracts, can have progression of cataracts post-LASIK, regression of LASIK  -will review testing with Dr. Echavarria and contact patient within 1-2 weeks  -pt given pricing information  today for comparison    2. Clear lenses, phakic, OU  -no appreciable cataracts  -pt aware that after LASIK, cataracts can form and will progress, which may require cataract surgery to correct the vision    Will contact patient in 1-2 weeks for decision regarding LASIK.    Niya Koenig MD  PGY-5, Cornea Fellow  Ophthalmology    Complete documentation of historical and exam elements from today's encounter can be found in the full encounter summary report (not reduplicated in this progress note). I personally obtained the chief complaint(s) and history of present illness.  I confirmed and edited as necessary the review of systems, past medical/surgical history, family history, social history, and examination findings as documented by others; and I examined the patient myself. I personally reviewed the relevant tests, images, and reports as documented above. I formulated and edited as necessary the assessment and plan and discussed the findings and management plan with the patient and family.     Niya Koenig MD  Cornea Fellow    Addendum:   11/20/18  -Discussed with Dr. Echavarria. Approved for LASIK each eye. Will have Tayla call patient to schedule.    Niya Koenig MD  PGY-5, Cornea Fellow  Ophthalmology

## 2018-11-21 ENCOUNTER — TELEPHONE (OUTPATIENT)
Dept: OPHTHALMOLOGY | Facility: CLINIC | Age: 48
End: 2018-11-21

## 2018-11-28 ENCOUNTER — TELEPHONE (OUTPATIENT)
Dept: OPHTHALMOLOGY | Facility: CLINIC | Age: 48
End: 2018-11-28

## 2018-11-28 NOTE — TELEPHONE ENCOUNTER
Pt requesting last glasses Rx to be mailed to pt's optical clinic  Last glasses Rx faxed per request 11-28-18  Pt aware  Ty Charlton RN 3:14 PM 11/28/18

## 2018-11-28 NOTE — TELEPHONE ENCOUNTER
[facility] Call Center    Phone Message    May a detailed message be left on voicemail: yes    Reason for Call: Other: Pt is requesting a copy of his last eye exam be faxed to [phone].     Action Taken: Message routed to:  [facility] Eye

## 2019-04-09 ENCOUNTER — RECORDS - HEALTHEAST (OUTPATIENT)
Dept: LAB | Facility: CLINIC | Age: 49
End: 2019-04-09

## 2019-04-13 LAB
SHBG SERPL-SCNC: 26 NMOL/L (ref 11–80)
TESTOST FREE SERPL-MCNC: 5.83 NG/DL (ref 4.7–24.4)
TESTOST SERPL-MCNC: 263 NG/DL (ref 240–950)

## 2019-11-01 ENCOUNTER — RECORDS - HEALTHEAST (OUTPATIENT)
Dept: LAB | Facility: CLINIC | Age: 49
End: 2019-11-01

## 2019-11-01 LAB
ALBUMIN SERPL-MCNC: 4.3 G/DL (ref 3.5–5)
ALP SERPL-CCNC: 94 U/L (ref 45–120)
ALT SERPL W P-5'-P-CCNC: 41 U/L (ref 0–45)
ANION GAP SERPL CALCULATED.3IONS-SCNC: 9 MMOL/L (ref 5–18)
AST SERPL W P-5'-P-CCNC: 16 U/L (ref 0–40)
BILIRUB SERPL-MCNC: 1.9 MG/DL (ref 0–1)
BUN SERPL-MCNC: 16 MG/DL (ref 8–22)
CALCIUM SERPL-MCNC: 9.7 MG/DL (ref 8.5–10.5)
CHLORIDE BLD-SCNC: 102 MMOL/L (ref 98–107)
CHOLEST SERPL-MCNC: 188 MG/DL
CO2 SERPL-SCNC: 30 MMOL/L (ref 22–31)
CREAT SERPL-MCNC: 0.96 MG/DL (ref 0.7–1.3)
FASTING STATUS PATIENT QL REPORTED: NO
GFR SERPL CREATININE-BSD FRML MDRD: >60 ML/MIN/1.73M2
GLUCOSE BLD-MCNC: 92 MG/DL (ref 70–125)
HDLC SERPL-MCNC: 37 MG/DL
LDLC SERPL CALC-MCNC: 124 MG/DL
POTASSIUM BLD-SCNC: 4.7 MMOL/L (ref 3.5–5)
PROT SERPL-MCNC: 7 G/DL (ref 6–8)
SODIUM SERPL-SCNC: 141 MMOL/L (ref 136–145)
TRIGL SERPL-MCNC: 136 MG/DL

## 2019-11-05 ENCOUNTER — RECORDS - HEALTHEAST (OUTPATIENT)
Dept: LAB | Facility: CLINIC | Age: 49
End: 2019-11-05

## 2019-11-05 LAB — MAGNESIUM SERPL-MCNC: 2 MG/DL (ref 1.8–2.6)

## 2020-11-13 ENCOUNTER — RECORDS - HEALTHEAST (OUTPATIENT)
Dept: LAB | Facility: CLINIC | Age: 50
End: 2020-11-13

## 2020-11-13 LAB
ALBUMIN SERPL-MCNC: 4.1 G/DL (ref 3.5–5)
ALP SERPL-CCNC: 88 U/L (ref 45–120)
ALT SERPL W P-5'-P-CCNC: 55 U/L (ref 0–45)
ANION GAP SERPL CALCULATED.3IONS-SCNC: 5 MMOL/L (ref 5–18)
AST SERPL W P-5'-P-CCNC: 20 U/L (ref 0–40)
BILIRUB SERPL-MCNC: 1.4 MG/DL (ref 0–1)
BUN SERPL-MCNC: 16 MG/DL (ref 8–22)
CALCIUM SERPL-MCNC: 8.7 MG/DL (ref 8.5–10.5)
CHLORIDE BLD-SCNC: 105 MMOL/L (ref 98–107)
CHOLEST SERPL-MCNC: 196 MG/DL
CO2 SERPL-SCNC: 30 MMOL/L (ref 22–31)
CREAT SERPL-MCNC: 1 MG/DL (ref 0.7–1.3)
FASTING STATUS PATIENT QL REPORTED: YES
GFR SERPL CREATININE-BSD FRML MDRD: >60 ML/MIN/1.73M2
GLUCOSE BLD-MCNC: 116 MG/DL (ref 70–125)
HDLC SERPL-MCNC: 36 MG/DL
LDLC SERPL CALC-MCNC: 142 MG/DL
MAGNESIUM SERPL-MCNC: 1.9 MG/DL (ref 1.8–2.6)
POTASSIUM BLD-SCNC: 4.4 MMOL/L (ref 3.5–5)
PROT SERPL-MCNC: 6.5 G/DL (ref 6–8)
SODIUM SERPL-SCNC: 140 MMOL/L (ref 136–145)
TRIGL SERPL-MCNC: 92 MG/DL

## 2021-02-09 ENCOUNTER — RECORDS - HEALTHEAST (OUTPATIENT)
Dept: ADMINISTRATIVE | Facility: OTHER | Age: 51
End: 2021-02-09

## 2021-03-09 ENCOUNTER — RECORDS - HEALTHEAST (OUTPATIENT)
Dept: ADMINISTRATIVE | Facility: OTHER | Age: 51
End: 2021-03-09

## 2021-05-28 ENCOUNTER — RECORDS - HEALTHEAST (OUTPATIENT)
Dept: ADMINISTRATIVE | Facility: CLINIC | Age: 51
End: 2021-05-28

## 2021-05-30 ENCOUNTER — RECORDS - HEALTHEAST (OUTPATIENT)
Dept: ADMINISTRATIVE | Facility: CLINIC | Age: 51
End: 2021-05-30

## 2021-05-30 VITALS — WEIGHT: 290 LBS | BODY MASS INDEX: 41.61 KG/M2

## 2021-05-31 VITALS — WEIGHT: 282.4 LBS | BODY MASS INDEX: 40.52 KG/M2

## 2021-06-02 ENCOUNTER — RECORDS - HEALTHEAST (OUTPATIENT)
Dept: ADMINISTRATIVE | Facility: CLINIC | Age: 51
End: 2021-06-02

## 2021-06-09 NOTE — PROGRESS NOTES
ASSESSMENT:  1. Foot swelling  Bilateral lobe right greater than left intermittent swelling toes dorsum of feet and ankles.  Suspect some type of an inflammatory process.  - HM1(CBC and Differential)  - C-Reactive Protein  - Erythrocyte Sedimentation Rate  - Parvovirus B19 Antibodies, IgG and IgM  - Uric Acid  - Basic Metabolic Panel  - HM1 (CBC with Diff)    2. Obesity  BMI now greater than 40, patient has been steadily and consistently gaining weight.      PLAN:  1.  For the foot swelling, laboratory studies as above.  2.  Depending on those studies, may need to consider podiatry referral.  3.  For the obesity, discussed possibility of weight loss surgery in the future.  For now his skin a focused on lifestyle.  4.  Patient due for a physical fall of this year.    Orders Placed This Encounter   Procedures     C-Reactive Protein     Erythrocyte Sedimentation Rate     Parvovirus B19 Antibodies, IgG and IgM     Uric Acid     Basic Metabolic Panel     HM1 (CBC with Diff)     There are no discontinued medications.    No Follow-up on file.    CHIEF COMPLAINT:  Chief Complaint   Patient presents with     Foot Swelling     right foot/ankle and toes - right is worse than left        SUBJECTIVE:  Aurelio is a 46 y.o. male presenting to the clinic today with concerns of right foot swelling.    Over the last few days, he has been experiencing swelling of his right foot, ankle, and toes. He also experiences pain in his right toes. His symptoms are worse with standing and walking for prolonged periods of time. He also has some slight swelling of his left toes, in which he notes that he has a toenail fungus. He has been taking aspirin. He notes that his swelling is somewhat improved today. Of note, he had dipped his feet in a whirlpool over the weekend which had helped to relieve the pressure and swelling in his feet.     Right knee pain: His pain is improved after right meniscus repair surgery on 11/18/16. He notes improved  range of motion of the knee.     Weight gain: His weight is up to 290 pounds today from 272 pounds in November. He is concerned about his weight gain. He has been monitoring his diet. He inquires if testosterone might contribute to his weight gain, and notes that his testosterone has been low in the past.     REVIEW OF SYSTEMS:   He denies joint pain.   All other systems are negative.    PFSH:  Immunization History   Administered Date(s) Administered     Hep B, historic 01/19/2007     IG-IM 02/09/2004     Influenza, Seasonal, Inj PF 11/29/2010, 11/30/2011     Influenza, inj, historic 01/01/1900, 11/25/2009, 10/08/2012, 09/23/2015     Influenza, seasonal,quad inj 36+ mos 09/23/2015, 10/31/2016     Influenza, seasonal,quad inj 6-35 mos 10/29/2014     Td, historic 02/09/2004, 10/28/2008, 09/30/2012     Tdap 10/28/2008     Social History     Social History     Marital status:      Spouse name: N/A     Number of children: 3     Years of education: N/A     Occupational History           Driving coaches      Social History Main Topics     Smoking status: Never Smoker     Smokeless tobacco: Never Used     Alcohol use No      Comment: Rare     Drug use: No     Sexual activity: Not on file     Other Topics Concern     Not on file     Social History Narrative    Diet- Regular        Exercise- Walking        Lives with family.        Goal Weight: Lose 50 lbs from 272.     Past Medical History:   Diagnosis Date     Reflux esophagitis      Family History   Problem Relation Age of Onset     Hyperlipidemia Brother      Dx at 9     Esophageal cancer Maternal Grandfather      Prostate cancer Paternal Grandfather      Esophageal cancer Maternal Uncle      Dx 60s     Esophageal cancer Paternal Uncle      Breast cancer Mother      No Medical Problems Brother        MEDICATIONS:  Current Outpatient Prescriptions   Medication Sig Dispense Refill     atorvastatin (LIPITOR) 80 MG tablet Take 80 mg by  mouth every morning.       magnesium oxide 500 mg Tab Take 500 mg by mouth daily.        naproxen sodium (ALEVE) 220 MG tablet Take 440 mg by mouth 2 (two) times a day as needed for pain.       omeprazole (PRILOSEC) 20 MG capsule Take 1 capsule (20 mg total) by mouth daily. 90 capsule 3     No current facility-administered medications for this visit.        TOBACCO USE:  History   Smoking Status     Never Smoker   Smokeless Tobacco     Never Used       VITALS:  Vitals:    03/06/17 1520   BP: 110/72   Pulse: 70   Weight: (!) 290 lb (131.5 kg)     Wt Readings from Last 3 Encounters:   03/06/17 (!) 290 lb (131.5 kg)   11/17/16 (!) 272 lb (123.4 kg)   10/31/16 (!) 272 lb (123.4 kg)       PHYSICAL EXAM:  Constitutional:   Reveals an alert, pleasant, talkative male.  Vitals: per nursing notes.  Extremities: Visible as well as palpable slight swelling toe digits. Slight swelling in the dorsum of the right foot.   Neuro:  Alert and oriented. Cranial nerves, motor, sensory exams are intact.  No gross focal deficits.  Psychiatric:  Memory intact, mood appropriate.    QUALITY MEASURES:  The following high BMI interventions were performed this visit: weight monitoring    DATA REVIEWED:  Additional History from Old Records Summarized (2): Reviewed surgical note from 11/18/16 regarding meniscus repair.   Decision to Obtain Records (1): None  Radiology Tests Summarized or Ordered (1): None  Labs Reviewed or Ordered (1): Ordered labs.  Medicine Test Summarized or Ordered (1): None  Independent Review of EKG, X-RAY, or RAPID STREP (2 each): None    The visit lasted a total of 14 minutes face to face with the patient. Over 50% of the time was spent counseling and educating the patient about foot swelling and weight loss.    IJody, am scribing for and in the presence of, Dr. Greenfield.    I, Dr. Greenfield, personally performed the services described in this documentation, as scribed by Jody Ledezma in my presence, and it  is both accurate and complete.    Total data points: 3

## 2021-06-13 NOTE — PROGRESS NOTES
ASSESSMENT:  1. Chronic Reflux Esophagitis  Chronic long-standing, daily omeprazole, symptomatic without.    - omeprazole (PRILOSEC) 20 MG capsule; Take 1 capsule (20 mg total) by mouth daily.  Dispense: 90 capsule; Refill: 3    2. Hyperlipidemia  Patient has had a significant elevation of LDL without cholesterol medication.  - Lipid Mayes  - Ambulatory referral to Dentistry  - Comprehensive Metabolic Panel    3. Prediabetes  [facility]    4. Osteoarthritis  Both knees affected.    5. Temporomandibular Joint-pain Dysfunction Syndrome  Long-standing, symptomatic.        PLAN:  1.  Influenza vaccine.  2.  The patient's dentist had referred him to the [facility], I formally place that referral, and recommend due to his symptoms and history.  3.  When the patient see his prior orthopedic surgeon at [facility], Dr. Garcia for discussion of interventions for knees suspect injections and/or physical therapy.  4.  Laboratory studies as above.  5.  Patient otherwise should be seen yearly.    Orders Placed This Encounter   Procedures     Influenza, Seasonal,Quad Inj, 36+ MOS     Lipid Cascade     Order Specific Question:   Fasting is required?     Answer:   No     Comprehensive Metabolic Panel     Ambulatory referral to Dentistry     Referral Priority:   Routine     Referral Type:   Dental     Referral Reason:   Service Not Available in Care System     Requested Specialty:   Dental General Practice     Number of Visits Requested:   1     Medications Discontinued During This Encounter   Medication Reason     magnesium oxide 500 mg Tab Therapy completed     naproxen sodium (ALEVE) 220 MG tablet Therapy completed     omeprazole (PRILOSEC) 20 MG capsule Reorder     atorvastatin (LIPITOR) 80 MG tablet Reorder       No Follow-up on file.    CHIEF COMPLAINT:  Chief Complaint   Patient presents with     Medication Refill       SUBJECTIVE:  Aurelio is a 46 y.o. male presenting to the clinic  for a medication check. He is fasting today.    Hyperlipidemia: His cholesterol was elevated the last time he had his blood work checked. He was started on atorvastatin at that time. He denies any adverse side effects on the medication.    GERD: He continues to use omeprazole every day for reflux. He states that if he misses a day on the prescription, he immediately starts to experience symptoms of GERD.    Knee Pain: He had a meniscal repair in the right knee last year. He states that it continues to abnormally swell if he hits it wrong. The knee continues to slow him down. He states that he and his orthopedic surgeon have not yet talked about the possibility of a knee replacement. He also notes that he has been told that his left knee has reduced to a bone on bone situation.     Temporomandibular Joint Dysfunction: He states that he was recently seen by his dentist and referred to the [facility] for TMJ. He states that he gets tightness in his jaw when he walks for extended periods of time. He has been dealing with this for the past twelve years.      Health Maintenance: He will receive the influenza vaccine today.     REVIEW OF SYSTEMS:   All other systems are negative.    PFSH:  Immunization History   Administered Date(s) Administered     Hep B, historic 01/19/2007     IG-IM 02/09/2004     Influenza, Seasonal, Inj PF 11/29/2010, 11/30/2011     Influenza, inj, historic 01/01/1900, 11/25/2009, 10/08/2012, 09/23/2015     Influenza, seasonal,quad inj 36+ mos 09/23/2015, 10/31/2016, 11/03/2017     Influenza, seasonal,quad inj 6-35 mos 10/29/2014     Td, historic 02/09/2004, 10/28/2008, 09/30/2012     Tdap 10/28/2008     Social History     Social History     Marital status:      Spouse name: N/A     Number of children: 3     Years of education: N/A     Occupational History           Driving coaches      Social History Main Topics     Smoking status: Never Smoker      Smokeless tobacco: Never Used     Alcohol use No      Comment: Rare     Drug use: No     Sexual activity: Not on file     Other Topics Concern     Not on file     Social History Narrative    Diet- Regular        Exercise- Walking        Lives with family.        Goal Weight: Lose 50 lbs from 272.     Past Medical History:   Diagnosis Date     Reflux esophagitis      Family History   Problem Relation Age of Onset     Hyperlipidemia Brother      Dx at 9     Esophageal cancer Maternal Grandfather      Prostate cancer Paternal Grandfather      Esophageal cancer Maternal Uncle      Dx 60s     Esophageal cancer Paternal Uncle      Breast cancer Mother      No Medical Problems Brother        MEDICATIONS:  Current Outpatient Prescriptions   Medication Sig Dispense Refill     atorvastatin (LIPITOR) 80 MG tablet Take 1 tablet (80 mg total) by mouth every morning. 90 tablet 3     omeprazole (PRILOSEC) 20 MG capsule Take 1 capsule (20 mg total) by mouth daily. 90 capsule 3     No current facility-administered medications for this visit.        TOBACCO USE:  History   Smoking Status     Never Smoker   Smokeless Tobacco     Never Used       VITALS:  Vitals:    11/03/17 0804   BP: 130/82   Pulse: 66   Weight: (!) 282 lb 6.4 oz (128.1 kg)     Wt Readings from Last 3 Encounters:   11/03/17 (!) 282 lb 6.4 oz (128.1 kg)   03/06/17 (!) 290 lb (131.5 kg)   11/17/16 (!) 272 lb (123.4 kg)       PHYSICAL EXAM:  Constitutional:   Reveals an alert, pleasant, talkative male.  Vitals: per nursing notes.  Musculoskeletal: Right knee diffusely swollen compared to left. Pain along the medial joint line. Crepitus with ROM but no limitation ROM.   Neuro:  Alert and oriented. Cranial nerves, motor, sensory exams are intact.  No gross focal deficits.  Psychiatric:  Memory intact, mood appropriate.    DATA REVIEWED:  Additional History from Old Records Summarized (2): Reviewed [facility] note 11/1/16; meniscal tear.   Decision to Obtain  Records (1): None.  Radiology Tests Summarized or Ordered (1): None.  Labs Reviewed or Ordered (1): Reviewed labs 10/31/16; lipid cascade. Ordered labs; lipid cascade.   Medicine Test Summarized or Ordered (1): None.  Independent Review of EKG, X-RAY, or RAPID STREP (2 each): None.     The visit lasted a total of 15 minutes face to face with the patient. Over 50% of the time was spent counseling and educating the patient about medication management.    IKristofer, am scribing for and in the presence of, Dr. Greenfield.    I, Dr. Greenfield, personally performed the services described in this documentation, as scribed by Kristofer Schofield in my presence, and it is both accurate and complete.    Total Data Points: 3

## 2021-06-14 NOTE — PROGRESS NOTES
[facility] Daily Progress Note  Patient Name: Aurelio Batista Jr.  Date of evaluation: 11/27/2017  Today's Date: 12/8/2017  Visit Number: 3 of 20 (per PT POC)  Referring provider: Dr. Garcia (knee)  Shira Guzman DDS (TMJ)   Referral Diagnosis:   Chronic headaches [R51]       TMJ (dislocation of temporomandibular joint) [S03.00XA]         Knee osteoarthritis [M17.10]  - Primary       Knee pain [M25.569]       Visit Diagnosis:     ICD-10-CM    1. Chronic tension-type headache, intractable G44.221    2. Chronic pain of right knee M25.561     G89.29    3. TMJ pain dysfunction syndrome M26.629        Assessment:       He did feel better post treatment today with good release of fascial tensions. There is still signficant fascial tensions present and this may be a long process to make permanent changes.     Patient's expectations/goals are: Realistic  Barriers to Learning or Achieving Goals: chronicity of problem    Goals:  Pt. will demonstrate/verbalize independence in self-management of condition in : 12 weeks  Pt. will be independent with home exercise program in : 12 weeks  Pt will: improve LEFS to > 42/80 to demonstrate improved LE function in 12 weeks  Pt will: report mild pain in jaw when walking at work in 12 weeks     Plan:      Plan for next visit: work on knee strengthening exercises. MT on jaw.        Subjective:        Things haven't been too bad. The jaw and neck have been more of an issue. He has really been popping his jaw quite a bit.   Ex are going good - no questions on them.      Objective:       MS and LV fascial tensions.     Treatment Today 12/8/2017   TREATMENT MINUTES COMMENTS   Evaluation     Self-care/ Home management     Manual therapy 23 Fascial release using Strain-Counterstrain of B UELF-MS, B cranial/cervical-LV   Neuromuscular Re-education     Therapeutic Activity     Therapeutic Exercises     Gait training     Modality__________________                Total 23    Blank  areas are intentional and mean the treatment did not include these items.        Yoandy Cazares, DPT, CLT  12/8/2017  9:01 AM

## 2021-06-14 NOTE — PROGRESS NOTES
"[facility] Daily Progress Note  Patient Name: Aurelio Batista Jr.  Date of evaluation: 11/27/2017  Today's Date: 12/7/2017  Visit Number: 2 of 20 (per PT POC)  Referring provider: Dr. Garcia (knee)  Shira Guzman DDS (TMJ)   Referral Diagnosis:   Chronic headaches [R51]       TMJ (dislocation of temporomandibular joint) [S03.00XA]         Knee osteoarthritis [M17.10]  - Primary       Knee pain [M25.569]       Visit Diagnosis:     ICD-10-CM    1. Chronic tension-type headache, intractable G44.221    2. Chronic pain of right knee M25.561     G89.29    3. TMJ pain dysfunction syndrome M26.629        Assessment:       Patient was able to do all exercises for LEs today without increased symptoms, though he states his knees are always sore. We did not focus on TMJ today as he is going to see PT tomorrow for this.   PT will continue to work on LE exercises for establishing a good HEP as well as MT strategies to work on TMJ dysfunction/pain.     Patient's expectations/goals are: Realistic  Barriers to Learning or Achieving Goals: chronicity of problem    Goals:  Pt. will demonstrate/verbalize independence in self-management of condition in : 12 weeks  Pt. will be independent with home exercise program in : 12 weeks  Pt will: improve LEFS to > 42/80 to demonstrate improved LE function in 12 weeks  Pt will: report mild pain in jaw when walking at work in 12 weeks     Plan:      Plan for next visit: work on knee strengthening exercises. MT on jaw.        Subjective:        \"Knees are always sore\"  TMJ still the same as well.     Functional limitations:  Walking (knee and jaw limit this), working (doesn't miss work)     Objective:       Challenged with exercises today, hips get fatigued.   Good form with ex.     Exercises: see flowsheet for date performed.  Exercise #1: supine SLR review  Comment #1: bridges review  Exercise #2: sidelying SLR review  Comment #2: supine pretzel stretch  Exercise #3: supine LTR " with legs corssed  Comment #3: standing hip ext pulses   Exercise #4: standing hip abduction pulses   Comment #4: standing hip flexion pulses  Exercise #5: supine hip flexor stretch     Treatment Today 12/7/2017   TREATMENT MINUTES COMMENTS   Evaluation     Self-care/ Home management     Manual therapy     Neuromuscular Re-education     Therapeutic Activity     Therapeutic Exercises 30 See ex above   Gait training     Modality__________________                Total 30    Blank areas are intentional and mean the treatment did not include these items.        Corrine Early, LYNDAT, CLT  12/7/2017  9:01 AM

## 2021-06-14 NOTE — PROGRESS NOTES
[facility]   Knee/TMJ Initial Evaluation  Patient Name: Aurelio Batista Jr.  Date of evaluation: 11/27/2017  Today's Date: 11/27/2017  Visit Number: 1 of 20 (per PT POC)  Referring provider: Dr. Garcia (knee)  Shira Guzman DDS (TMJ)   Referral Diagnosis:   Chronic headaches [R51]       TMJ (dislocation of temporomandibular joint) [S03.00XA]         Knee osteoarthritis [M17.10]  - Primary       Knee pain [M25.569]       Visit Diagnosis:     ICD-10-CM    1. Chronic tension-type headache, intractable G44.221    2. Chronic pain of right knee M25.561     G89.29    3. TMJ pain dysfunction syndrome M26.629        Assessment:      Aurelio is a 47 y.o. male who presents to physical therapy today with complaints of bilateral jaw pain and headaches that began 12 years ago after he hit his head on a snowplow truck.  Patient also here for his right knee pain that has been present for 25 years since having an ACL repair, as well as a recent surgery in Feb 2017. Clinical exam today reveals knee pain upon standing from a chair, full ROM, poor body mechanics with squat, lunges, clicking and pain in jaw R>L with opening and closing of mouth. Patient may benefit from further PT in order to improve function.     Patient's expectations/goals are: Realistic  Barriers to Learning or Achieving Goals: chronicity of problem    Goals:  Pt. will demonstrate/verbalize independence in self-management of condition in : 12 weeks  Pt. will be independent with home exercise program in : 12 weeks  Pt will: improve LEFS to > 42/80 to demonstrate improved LE function in 12 weeks  Pt will: report mild pain in jaw when walking at work in 12 weeks     Plan:      Plan for next visit: work on knee strengthening exercises. MT on jaw.     Plan of Care:   Authorization / Certification Start Date: 11/27/17  Authorization / Certification End Date: 02/27/18  Authorization / Certification Number of Visits: 20  Communication with: Referral  Source  Patient Related Instruction: Nature of Condition;Treatment plan and rationale;Self Care instruction;Basis of treatment;Body mechanics  Discharge Planning: to self care/HEP  Precautions / Restrictions : none  Therapeutic Exercise: ROM;Stretching;Strengthening  Neuromuscular Reeducation: posture;kinesio tape;balance/proprioception;TNE;core  Manual Therapy: soft tissue mobilization;myofascial release;joint mobilization;muscle energy  Modalities: electrical stimulation;TENS;iontophoresis;ultrasound;cold pack;hot pack  Gait Training: as needed  Equipment: TENS unit;theraband  Patient is in agreement with PT plan of care and goals.        Subjective:       History of Present Illness:    Aurelio is a 47 y.o. male who presents to physical therapy today with complaints of bilateral knee pain R>L and bilateral jaw pain. KNee pain has been present for 25 years and jaw pain for 12 years.  Symptomes for both worsen with walking.     TMJ: bilateral: Has seen a chiropractor over the years for jaw adjustments, and activator, did acupuncture. None of this has helped. Heat helps mildly.  Has not tried massages, ice, PT, device.   Pain is always there, increases only with walking.   Denies chewing gum and biting nails.   Reports he hit his head on a snow plow on the top of his head and he fell to the ground about 12 years ago, this is when his jaw pain started.     Right knee: had surgery on his right knee Feb 2017 for menisectomy and cleaning out loose body. Had the surgery d/t his right knee hyperextending and painful. It was very painful at the time, so lead to the surgery. In general, the surgery helped, but the past few months, pain has returned and feels like his right knee is painful randomly with walking. The calf gets cramping.     Patient reports that he works 7 days per week at various jobs and doesn't have time for working out.     Pertinent PMH: TMJ pain, chronic knee pain, recent knee surgery on right knee,  history left knee ACL repair, right ACL 25 years ago.   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    Knee/TMJ Examination  1. Chronic tension-type headache, intractable     2. Chronic pain of right knee     3. TMJ pain dysfunction syndrome       Precautions/Restrictions:  None  Involved Side: right knee, bilateral jaw  Assistive Device: none  Gait Observation: non antalgic  Lumbar Clearing: good ROM, no knee pain  Hip Clearing: decreased bilat hip ROM  Cervical Clearing:   Flexion: normal, ext = 55 deg, rotation, limited turning left.  Shoulder clearing: mild reduced ROM    Knee ROM:   Date: 11/27/2017      Knee ROM ( ) AROM in degrees AROM in degrees AROM in degrees    Right Left Right Left Right Left   Knee Flexion (0-130 ) 130 130       Knee extension (0 ) 0 0        PROM in degrees PROM in degrees PROM in degrees    Right Left Right Left Right Left   Knee Flexion (0-130 )         Knee extension (0 )            Hip/Knee Strength     Date: 11/27/2017     Hip/Knee Strength (/5) MMT MMT MMT    Right Left Right Left Right Left   Hip Flexion 5 5       Hip Abduction         Hip Adduction         Hip Extension         Hip External Rotation         Hip Internal Rotation         Knee Extension 5 5       Knee Flexion 5 5         Knee Special Tests:     Meniscal Tests Right (+)/(-) Left (+)/(-) Ligament Tests Right (+)/(-) Left (+)/(-)   Hi's   Lachman     Jt line tenderness   Anterior Drawer     Thessaly   Posterior Drawer     Apley's   Posterior Sag     Knee OA   Valgus Stress - -   1. >  51 y/o  2. Stiffness > 30 minutes  3. Crepitus   4. Bony tenderness  5. Bone enlargement  6. No warmth to the touch   Varus Stress - -   Ely's   Bonnie       Flexibility: decreased bilat hip ROM, right is stiffer than left albert IR    Palpation: palpable restriction to bilat masseter, scalenes    TMJ observation: Clicking on R TMJ with opening and closing, pat    Outcome Measure: Lower Extremity Functional Scale (_/80): 33   Scores range from 0-80, where a score of 80 represents maximum function. The minimal clinically important difference is a positive change of 9 points.    Treatment Today  TREATMENT MINUTES COMMENTS   Evaluation 40 Knee and jaw, long eval and long history.   Self-care/ Home management     Manual therapy     Neuromuscular Re-education     Therapeutic Activity     Therapeutic Exercises 20 Discussed exercises to work on for his knee, discussed that doing a squat, lunge, push up challenge right now while trying to rehab is probably not the best idea, he should start with some simpler exercises for now and gradually work into exercises like that. Also, discussed plan with patient for PT.    Gait training     Modality__________________                Total 60    Blank areas are intentional and mean the treatment did not include these items.     PT Evaluation Code: (Please list factors)  Patient History/Comorbidities: obesity  Examination: right knee pain, bilat jaw pain  Clinical Presentation: stable  Clinical Decision Making: low    Patient History/  Comorbidities Examination  (body structures and functions, activity limitations, and/or participation restrictions) Clinical Presentation Clinical Decision Making (Complexity)   No documented Comorbidities or personal factors 1-2 Elements Stable and/or uncomplicated Low   1-2 documented comorbidities or personal factor 3 Elements Evolving clinical presentation with changing characteristics Moderate   3-4 documented comorbidities or personal factors 4 or more  Unstable and unpredictable High               Corrine Early, DPT, CLT  11/27/2017  9:01 AM

## 2021-06-14 NOTE — PROGRESS NOTES
[facility] Daily Progress Note  Patient Name: Aurelio Batista Jr.  Date of evaluation: 11/27/2017  Today's Date: 12/21/2017  Visit Number: 6 of 20 (per PT POC)  Referring provider: Dr. Garcia (knee)  Shira Guzman DDS (TMJ)   Referral Diagnosis:   Chronic headaches [R51]       TMJ (dislocation of temporomandibular joint) [S03.00XA]         Knee osteoarthritis [M17.10]  - Primary       Knee pain [M25.569]       Visit Diagnosis:     ICD-10-CM    1. Chronic tension-type headache, intractable G44.221    2. Chronic pain of right knee M25.561     G89.29    3. TMJ pain dysfunction syndrome M26.629        Assessment:       Patient does well with exercises, will likely just do one more session of exercise for knees and will continue with PT for MT/exercises for jaw.     Patient's expectations/goals are: Realistic  Barriers to Learning or Achieving Goals: chronicity of problem    Goals:  Pt. will demonstrate/verbalize independence in self-management of condition in : 12 weeks  Pt. will be independent with home exercise program in : 12 weeks  Pt will: improve LEFS to > 42/80 to demonstrate improved LE function in 12 weeks  Pt will: report mild pain in jaw when walking at work in 12 weeks     Plan:      Plan for next visit: work on knee strengthening exercises. MT on jaw.     Plans to get injection in knees on 12-.      Subjective:        Patient is reporting that his knees are sore at night. During the day, he states during the day they aren't bad, doesn't give his knees a chance to stiffen up from moving all night.     States the jaw hasn't been bad, but feels like it will get worse d/t the weather getting really cold.      Objective:       Exercises: see flowsheet for date performed in clinic  Exercise #1: supine SLR 10x2  Comment #1: bridges review  Exercise #2: sidelying SLR 10x bilat  Comment #2: supine pretzel stretch  Exercise #3: supine LTR with legs corssed  Comment #3: standing hip ext pulses    Exercise #4: standing hip abduction pulses   Comment #4: standing hip flexion pulses  Exercise #5: supine hip flexor stretch 30 sec bilat  Comment #5: bike 5:30  Exercise #6: Total gym: 15x2 double leg press highest notch, single leg   Comment #6: Total gym: hamstring curls 15x2 double leg level 5  Exercise #7: supine bicycle abs.   Comment #7: sit<>stand on box 10x2  Exercise #8: sidestepping band, green HEP 20x2  Comment #8: ed for supine bridges heels on couch for home HS strengthening.   Exercise #9: side plank on knees      Treatment Today 12/21/2017   TREATMENT MINUTES COMMENTS   Evaluation     Self-care/ Home management     Manual therapy     Neuromuscular Re-education     Therapeutic Activity     Therapeutic Exercises 45 Ex as above.  Did discuss with patient that since he has tried a TENs unit in the past, it may be a good idea for him to get one at home for the evenings when his knees are sore. Also discussed icing/heat on his knees in evenings as well.    Gait training     Modality__________________                Total 45    Blank areas are intentional and mean the treatment did not include these items.        Corrine Early, LYNDAT, CLT  12/21/2017  9:01 AM

## 2021-06-14 NOTE — PROGRESS NOTES
[facility] Daily Progress Note  Patient Name: Aurelio Batista Jr.  Date of evaluation: 11/27/2017  Today's Date: 12/15/2017  Visit Number: 5 of 20 (per PT POC)  Referring provider: Dr. Garcia (knee)  Shira Guzman DDS (TMJ)   Referral Diagnosis:   Chronic headaches [R51]       TMJ (dislocation of temporomandibular joint) [S03.00XA]         Knee osteoarthritis [M17.10]  - Primary       Knee pain [M25.569]       Visit Diagnosis:     ICD-10-CM    1. Chronic tension-type headache, intractable G44.221    2. Chronic pain of right knee M25.561     G89.29    3. TMJ pain dysfunction syndrome M26.629        Assessment:       He did con't to have a little soreness in his jaw post treatment today but that isn't abnormal with manual work. He does seem to be improving on his jaw sx and continued work will be needed to maintain these gains.     Patient's expectations/goals are: Realistic  Barriers to Learning or Achieving Goals: chronicity of problem    Goals:  Pt. will demonstrate/verbalize independence in self-management of condition in : 12 weeks  Pt. will be independent with home exercise program in : 12 weeks  Pt will: improve LEFS to > 42/80 to demonstrate improved LE function in 12 weeks  Pt will: report mild pain in jaw when walking at work in 12 weeks     Plan:      Plan for next visit: work on knee strengthening exercises. MT on jaw.      Subjective:        The jaw has seemed to be quite a bit better but this morning it is pretty stiff.   Exercises are going ok - he is trying to do them at home.      Objective:       MS, neural, Art, LV fascial tensions.     Treatment Today 12/15/2017   TREATMENT MINUTES COMMENTS   Evaluation     Self-care/ Home management     Manual therapy 53 Fascial release using Strain-Counterstrain of B TRIG/FAC-N, MAX-A, FAC-LV, MAS-LV, B cervical-LV, B cervical mastication-MS   Neuromuscular Re-education     Therapeutic Activity     Therapeutic Exercises     Gait training      Modality__________________                Total 53    Blank areas are intentional and mean the treatment did not include these items.        Yoandy Cazares, DPT, CLT  12/15/2017  9:01 AM

## 2021-06-14 NOTE — PROGRESS NOTES
[facility] Daily Progress Note  Patient Name: Aurelio Batista Jr.  Date of evaluation: 11/27/2017  Today's Date: 12/13/2017  Visit Number: 4 of 20 (per PT POC)  Referring provider: Dr. Garcia (knee)  Shira Guzman DDS (TMJ)   Referral Diagnosis:   Chronic headaches [R51]       TMJ (dislocation of temporomandibular joint) [S03.00XA]         Knee osteoarthritis [M17.10]  - Primary       Knee pain [M25.569]       Visit Diagnosis:     ICD-10-CM    1. Chronic tension-type headache, intractable G44.221    2. Chronic pain of right knee M25.561     G89.29    3. TMJ pain dysfunction syndrome M26.629        Assessment:       Patient continues to benefit from further PT to establish a good routine for bilateral knee pain, global strengthening. As well as manual therapy techniques for knee/TMJ pain myofascial restrictions.     Patient's expectations/goals are: Realistic  Barriers to Learning or Achieving Goals: chronicity of problem    Goals:  Pt. will demonstrate/verbalize independence in self-management of condition in : 12 weeks  Pt. will be independent with home exercise program in : 12 weeks  Pt will: improve LEFS to > 42/80 to demonstrate improved LE function in 12 weeks  Pt will: report mild pain in jaw when walking at work in 12 weeks     Plan:      Plan for next visit: work on knee strengthening exercises. MT on jaw.      Subjective:        After last session with MT done for head/neck, patient states in general, he hasn't been cracking his jaw as much the past few days since the manual therapy.   Knees are sore at the end of the day, mornings are the best.   Ex are going good - no questions on them.      Objective:       Patient able to do all exercises in clinic today with little to no tightening of jaw musculature. He doesn't appear to be clinching jaw during session. He has some knee stiffness, but not reporting much pain, but it is morning and his pain is usually worse in the evenings, or after  sitting for a while.     PT discussed with him ideas like pool exercises, going to a gym on a regular basis, using his total gym, doing his PT exercises regularly, going for walks. Patient states he is unable to go to a pool/gym d/t cost.     Exercises: see flowsheet for date performed in clinic.  Exercise #1: supine SLR 10x2  Comment #1: bridges review  Exercise #2: sidelying SLR 10x bilat  Comment #2: supine pretzel stretch  Exercise #3: supine LTR with legs corssed  Comment #3: standing hip ext pulses   Exercise #4: standing hip abduction pulses   Comment #4: standing hip flexion pulses  Exercise #5: supine hip flexor stretch 30 sec bilat  Comment #5: Nustep warm up 5:00  Exercise #6: Total gym: 15x2 double leg press highest notch, single leg   Comment #6: Total gym: hamstring curls 15x double leg level 5, 10x bilat single HS curl  Exercise #7: supine bicycle abs.   Comment #7: sit<>stand on box 10x2  Exercise #8: sidestepping band, green HEP 20x2  Comment #8: ed for supine bridges heels on couch for home HS strengthening.       Treatment Today 12/13/2017   TREATMENT MINUTES COMMENTS   Evaluation     Self-care/ Home management     Manual therapy     Neuromuscular Re-education     Therapeutic Activity     Therapeutic Exercises 45 See ex.    Gait training     Modality__________________                Total 45    Blank areas are intentional and mean the treatment did not include these items.        Corrine Early, LYNDAT, CLT  12/13/2017  9:01 AM

## 2021-06-15 NOTE — PROGRESS NOTES
[facility] Daily Progress Note  Patient Name: Aurelio Batista Jr.  Date of evaluation: 11/27/2017  Today's Date: 12/26/2017  Visit Number: 6 of 20 (per PT POC)  Referring provider: Dr. Garcia (knee)  Shira Guzman DDS (TMJ)   Referral Diagnosis:   Chronic headaches [R51]       TMJ (dislocation of temporomandibular joint) [S03.00XA]         Knee osteoarthritis [M17.10]  - Primary       Knee pain [M25.569]       Visit Diagnosis:     ICD-10-CM    1. Chronic tension-type headache, intractable G44.221    2. Chronic pain of right knee M25.561     G89.29    3. TMJ pain dysfunction syndrome M26.629        Assessment:       He con't to have good results with the manual work on his jaw/head. There was very good release of fascial tensions with treatment today. He may benefit from mouth work as well.     Patient's expectations/goals are: Realistic  Barriers to Learning or Achieving Goals: chronicity of problem    Goals:  Pt. will demonstrate/verbalize independence in self-management of condition in : 12 weeks  Pt. will be independent with home exercise program in : 12 weeks  Pt will: improve LEFS to > 42/80 to demonstrate improved LE function in 12 weeks  Pt will: report mild pain in jaw when walking at work in 12 weeks     Plan:      Plan for next visit: work on knee strengthening exercises. MT on jaw.      Plans to get injection in knees on 12-.      Subjective:        He feels like it is getting better. It isn't hurting as much. He has had a couple HA's since the last Rx.      The knee is still hurting quite a bit. He is seeing an ortho MD this afternoon. He is feeling pretty good with his exercises.      Objective:       Neural, LV fascia tension    Treatment Today 12/26/2017   TREATMENT MINUTES COMMENTS   Evaluation     Self-care/ Home management     Manual therapy 25 Fascial release using Strain-Counterstrain of cranial dura-N, B cranial row-N, FAC-LV, FAC-N B.    Neuromuscular Re-education 15  Recip inhib of neural, lV fascia   Therapeutic Activity     Therapeutic Exercises 15 AA/PROM to cranium, C-spine, ribs   Gait training     Modality__________________                Total 55    Blank areas are intentional and mean the treatment did not include these items.        Yoandy Cazares, DPT, CLT  12/26/2017  9:01 AM

## 2021-06-15 NOTE — PROGRESS NOTES
[facility] Daily Progress Note  Patient Name: Aurelio Batista Jr.  Date of evaluation: 11/27/2017  Today's Date: 1/8/2018  Visit Number: 9 of 20 (per PT POC)  Referring provider: Dr. Garcia (knee)  Shira Guzman DDS (TMJ)   Referral Diagnosis:   Chronic headaches [R51]       TMJ (dislocation of temporomandibular joint) [S03.00XA]         Knee osteoarthritis [M17.10]  - Primary       Knee pain [M25.569]       Visit Diagnosis:     ICD-10-CM    1. Chronic tension-type headache, intractable G44.221    2. Chronic pain of right knee M25.561     G89.29    3. TMJ pain dysfunction syndrome M26.629        Assessment:       There was good release of fascial tensions. He was told to look at things in terms of gray and not black/white with his jaw pain. He will need to improve on this as this can help to centralize sx and progress sx.     Patient's expectations/goals are: Realistic  Barriers to Learning or Achieving Goals: chronicity of problem    Goals:  Pt. will demonstrate/verbalize independence in self-management of condition in : 12 weeks  Pt. will be independent with home exercise program in : 12 weeks  Pt will: improve LEFS to > 42/80 to demonstrate improved LE function in 12 weeks  Pt will: report mild pain in jaw when walking at work in 12 weeks     Plan:      Plan for next visit: work on knee strengthening exercises. MT on jaw.      Subjective:        He has found that with the cold snap it wasn't as painful.   Normal day the jaw is about a 5/10.   He does still get some popping in both jaws but it isn't always painful. It feels like a pressure in the jaws.     Objective:       LV, art, neural fascial tensions.     Treatment Today 1/8/2018   TREATMENT MINUTES COMMENTS   Evaluation     Self-care/ Home management     Manual therapy 25 Fascial release using Strain-Counterstrain of B upper cer pre-gang-N, B facial-N/Art/LV.    Neuromuscular Re-education 15 Recip inhib of neural, LV, art fascia   Therapeutic  Activity     Therapeutic Exercises 15 AA/PROM to cranium, C-spine   Gait training     Modality__________________                Total 55    Blank areas are intentional and mean the treatment did not include these items.        Yoandy Cazares, DPT  1/8/2018

## 2021-06-15 NOTE — PROGRESS NOTES
[facility] Daily Progress Note  Patient Name: Aurelio Batista Jr.  Date of evaluation: 11/27/2017  Today's Date: 12/27/2017  Visit Number: 6 of 20 (per PT POC)  Referring provider: Dr. Garcia (knee)  Shira Guzman DDS (TMJ)   Referral Diagnosis:   Chronic headaches [R51]       TMJ (dislocation of temporomandibular joint) [S03.00XA]         Knee osteoarthritis [M17.10]  - Primary       Knee pain [M25.569]       Visit Diagnosis:     ICD-10-CM    1. Chronic tension-type headache, intractable G44.221    2. Chronic pain of right knee M25.561     G89.29    3. TMJ pain dysfunction syndrome M26.629        Assessment:       He con't to have good results with the manual work on his jaw/head.   Patient is doing well with exercises for his knees, he is starting to become independent with exercises.    Patient's expectations/goals are: Realistic  Barriers to Learning or Achieving Goals: chronicity of problem    Goals:  Pt. will demonstrate/verbalize independence in self-management of condition in : 12 weeks  Pt. will be independent with home exercise program in : 12 weeks  Pt will: improve LEFS to > 42/80 to demonstrate improved LE function in 12 weeks  Pt will: report mild pain in jaw when walking at work in 12 weeks     Plan:      Plan for next visit: work on knee strengthening exercises. MT on jaw.      Subjective:        Patient had an injection yesterday in left knee from ortho. He reports he was told it will take up to 4-6 weeks before he feels the full effects, but he is already feeling less clicking. He will likely get another injection in right knee at the end of January.     He feels like the jaw is getting slightly better, but still there.      Objective:       Patient states he has less pain in left knee today with exercises compared to right knee since her injection yesterday.     Exercises: see date performed in clinic on flowsheet  Exercise #1: supine SLR 10x2  Comment #1: bridges review  Exercise  #2: sidelying SLR 10x bilat  Comment #2: supine pretzel stretch  Exercise #3: supine LTR with legs crossed  Comment #3: standing hip ext pulses   Exercise #4: standing hip abduction pulses   Comment #4: standing hip flexion pulses  Exercise #5: supine hip flexor stretch 30 sec bilat  Comment #5: bike 5:30  Exercise #6: Total gym: 15x2 double leg press highest notch, single leg   Comment #6: Total gym: hamstring curls 15x double leg level 5  Exercise #7: supine bicycle abs. 10x  Comment #7: sit<>stand on box 10x2  Exercise #8: sidestepping band, green HEP 20x2  Comment #8: ed for supine bridges heels on couch for home HS strengthening.   Exercise #9: side plank on knees  Comment #9: stability ball: supine walkouts hold 30 sec x 3  Exercise #10: stability ball: prone on ball alt lift UE/LE  Comment #10: stability ball: supine HS curls  10x2      Treatment Today 12/27/2017   TREATMENT MINUTES COMMENTS   Evaluation     Self-care/ Home management     Manual therapy     Neuromuscular Re-education     Therapeutic Activity     Therapeutic Exercises 45 See ex   Gait training     Modality__________________                Total 45    Blank areas are intentional and mean the treatment did not include these items.        Corrine Early, DPT, CLT  12/27/2017

## 2021-06-16 PROBLEM — M19.90 OSTEOARTHRITIS: Status: ACTIVE | Noted: 2017-11-03

## 2021-06-16 NOTE — PROGRESS NOTES
[facility] Discharge Summary  Patient Name: Aurelio Batista Jr.  Date: 3/6/2018  Referral Diagnosis: HA, R knee pain  Referring provider: Jet Greenfield MD  Visit Diagnosis:   1. Chronic tension-type headache, intractable     2. Chronic pain of right knee     3. TMJ pain dysfunction syndrome            Goals:  Pt. will demonstrate/verbalize independence in self-management of condition in : 12 weeks  Pt. will be independent with home exercise program in : 12 weeks  Pt will: improve LEFS to > 42/80 to demonstrate improved LE function in 12 weeks  Pt will: report mild pain in jaw when walking at work in 12 weeks    Patient was seen for 9 visits from 11/27/17 to 1/8/18 with 0 missed appointments.  The patient attended therapy initially, but did not finish the therapy sessions prescribed.  Goals were not fully achieved. Explanation for goals not achieved: insurance changes increased his co-pay which made it difficult to affort. He was making progress at last visit.     Therapy will be discontinued at this time.  The patient will need a new referral to resume.    Thank you for your referral.  Yoandy Cazares  3/6/2018  7:53 AM

## 2021-10-27 ENCOUNTER — LAB REQUISITION (OUTPATIENT)
Dept: LAB | Facility: CLINIC | Age: 51
End: 2021-10-27

## 2021-10-27 DIAGNOSIS — E78.2 MIXED HYPERLIPIDEMIA: ICD-10-CM

## 2021-10-27 LAB
ALBUMIN SERPL-MCNC: 3.9 G/DL (ref 3.5–5)
ALP SERPL-CCNC: 82 U/L (ref 45–120)
ALT SERPL W P-5'-P-CCNC: 45 U/L (ref 0–45)
ANION GAP SERPL CALCULATED.3IONS-SCNC: 8 MMOL/L (ref 5–18)
AST SERPL W P-5'-P-CCNC: 20 U/L (ref 0–40)
BILIRUB SERPL-MCNC: 1.7 MG/DL (ref 0–1)
BUN SERPL-MCNC: 16 MG/DL (ref 8–22)
CALCIUM SERPL-MCNC: 9.4 MG/DL (ref 8.5–10.5)
CHLORIDE BLD-SCNC: 106 MMOL/L (ref 98–107)
CHOLEST SERPL-MCNC: 205 MG/DL
CO2 SERPL-SCNC: 28 MMOL/L (ref 22–31)
CREAT SERPL-MCNC: 0.93 MG/DL (ref 0.7–1.3)
GFR SERPL CREATININE-BSD FRML MDRD: >90 ML/MIN/1.73M2
GLUCOSE BLD-MCNC: 107 MG/DL (ref 70–125)
HDLC SERPL-MCNC: 37 MG/DL
LDLC SERPL CALC-MCNC: 137 MG/DL
POTASSIUM BLD-SCNC: 5 MMOL/L (ref 3.5–5)
PROT SERPL-MCNC: 6.4 G/DL (ref 6–8)
SODIUM SERPL-SCNC: 142 MMOL/L (ref 136–145)
TRIGL SERPL-MCNC: 157 MG/DL

## 2021-10-27 PROCEDURE — 80061 LIPID PANEL: CPT | Performed by: STUDENT IN AN ORGANIZED HEALTH CARE EDUCATION/TRAINING PROGRAM

## 2021-10-27 PROCEDURE — 82040 ASSAY OF SERUM ALBUMIN: CPT | Performed by: STUDENT IN AN ORGANIZED HEALTH CARE EDUCATION/TRAINING PROGRAM

## 2022-10-14 LAB
ALT SERPL-CCNC: 49 IU/L (ref 8–45)
AST SERPL-CCNC: 20 IU/L (ref 2–40)
CHOLESTEROL (EXTERNAL): 226 MG/DL (ref 100–199)
CREATININE (EXTERNAL): 0.96 MG/DL (ref 0.72–1.25)
GFR ESTIMATED (EXTERNAL): >90 ML/MIN/1.73
GLUCOSE (EXTERNAL): 114 MG/DL (ref 65–100)
HBA1C MFR BLD: 6.5 %
HDLC SERPL-MCNC: 34 MG/DL
LDL CHOLESTEROL CALCULATED (EXTERNAL): 172 MG/DL
NON HDL CHOLESTEROL (EXTERNAL): 192 MG/DL
POTASSIUM (EXTERNAL): 4.8 MMOL/L (ref 3.5–5)
TRIGLYCERIDES (EXTERNAL): 100 MG/DL
TSH SERPL-ACNC: 1.62 UIU/ML (ref 0.35–4.94)

## 2022-10-20 RX ORDER — CLONIDINE HYDROCHLORIDE 0.1 MG/1
0.1 TABLET ORAL 2 TIMES DAILY
Status: ON HOLD | COMMUNITY
End: 2022-10-28

## 2022-10-20 RX ORDER — AMITRIPTYLINE HYDROCHLORIDE 10 MG/1
10 TABLET ORAL AT BEDTIME
Status: ON HOLD | COMMUNITY
End: 2022-10-28

## 2022-10-24 NOTE — PROGRESS NOTES
Discharge plan according to [facility]:       10/20/22 1322   Discharge Planning   Patient/Family Anticipates Transition to home with family;home   Living Arrangements   People in Home spouse   Type of Residence Private Residence   Is your private residence a single family home or apartment? Single family home   Number of Stairs, Within Home, Primary ten   Stair Railings, Within Home, Primary railings safe and in good condition   Bathroom Shower/Tub Walk-in shower   Equipment Currently Used at Home none   Support System   Support Systems Spouse/Significant Other   Do you have someone available to stay with you one or two nights once you are home? Yes

## 2022-10-26 NOTE — TREATMENT PLAN
Orthopedic Surgery Pre-Op Plan: Aurelio Batista Jr.  pre-op review. This is NOT an H&P   Surgeon: Dr. Laguna    [facility]: [facility]  Name of Surgery: Right Total Knee Arthroplasty   Date of Surgery: 10/28/22  H&P: Completed on 10/14/22 by Dr. Gavin Robles at [facility].   History of ASA, NSAIDS, vitamin and/or herbal supplements within 10 days: No  History of blood thinners: No    Plan:   1) Discharge Plan: Home morning of POD 1 with assist of wife, Janine. Please see Discharge Planning section near bottom of this note for further details.     2) New Diagnosis of Type 2 Diabetes Mellitus: Recently diagnosed with hemoglobin A1C 6.5 on 10/14/22 at preop exam. Had telephone visit with PCP on 10/17/22 to go over diabetes management basics, including diabetic diet, home glucose monitoring and starting on new medications for diabetes. PCP starting patient on metformin and dapagliflozin (Farxiga). I recommend blood glucose checks at least three times a day and at bedtime during hospital stay. Goal BG < 180 to decrease risk for infection and wound healing complications. Nursing to please notify Hospitalist if BG > 180.      2) Hyperlipidemia: on atorvastatin.    3) GERD: on omeprazole.     4) Morbid Obesity: BMI 40.1, Wt: 280 lbs at preop exam. I recommend continued efforts at safe weight loss following recovery from surgery. If patient is interested in further assistance with weight loss, please consider referral to [facility]. They offer both non-surgical and surgical evidence-based weight loss strategies. Call [phone] to schedule a consultation to learn more.      Patient appears medically optimized for upcoming surgery. I would recommend Hospitalist Consult to assist with medical management. Please call me below with any questions on this patient.       Review of Systems Notable for: Type 2 Diabetes Mellitus-new diagnosis, HLD, GERD, Morbid  Obesity.     Past Medical History:   Past Medical History:   Diagnosis Date     Arthritis      Gastroesophageal reflux disease      Hypertension      Obese      Past Surgical History:   Procedure Laterality Date     ANTERIOR CRUCIATE LIGAMENT REPAIR Left      APPENDECTOMY            HC KNEE SCOPE,SINGLE MENISECTOMY Right 11/21/2016    Procedure: RIGHT KNEE ARTHROPSCOPY WITH PARTIAL MEDIAL AND LATERAL MENISECTOMY, ABRASION CHONDROPLASTY PATELLOFEMORAL AND REMOVAL LOOSE BODY;  Surgeon: David Garcia MD;  Location: [facility];  Service: Orthopedics     INGUINAL HERNIA REPAIR Bilateral      LAPAROSCOPIC CHOLECYSTECTOMY N/A 3/25/2015    Procedure: CHOLECYSTECTOMY LAPAROSCOPIC;  Surgeon: Yoandy Ventura MD;  Location: [facility];  Service:      MENISCECTOMY Right 2016     PILONIDAL CYST DRAINAGE            VASECTOMY              Current Medications:  Patient's Medications   New Prescriptions    No medications on file   Previous Medications    AMITRIPTYLINE (ELAVIL) 10 MG TABLET    Take 10 mg by mouth At Bedtime    ATORVASTATIN (LIPITOR) 80 MG TABLET    Take 80 mg by mouth    CLONIDINE (CATAPRES) 0.1 MG TABLET    Take 0.1 mg by mouth 2 times daily    OMEPRAZOLE (PRILOSEC) 20 MG DR CAPSULE    Take 20 mg by mouth   Modified Medications    No medications on file   Discontinued Medications    OMEPRAZOLE (PRILOSEC) 20 MG CR CAPSULE    Take 20 mg by mouth       ALLERGIES:  Allergies   Allergen Reactions     Codeine Nausea and Vomiting     GI upset         Social History  Social History     Tobacco Use     Smoking status: Never     Smokeless tobacco: Never   Substance Use Topics     Alcohol use: Not Currently     Drug use: Not Currently       Any Abnormal Recent Diagnostics? Yes  Hemoglobin A1C 6.5 on 10/14/22: new diagnosis of diabetes based on this A1C. Had telephone visit with PCP on 10/17/22 to go over basic diabetes management eduction. PCP starting patient on metformin and Farxiga for diabetes. Will  monitor BG's closely during hospital stay. Goal BG < 180.     Discharge Planning:   Discharge plan according to [facility]:     Home morning of POD 1 with assist of Spouse       10/20/22 0682   Discharge Planning   Patient/Family Anticipates Transition to home with family;home   Living Arrangements   People in Home spouse   Type of Residence Private Residence   Is your private residence a single family home or apartment? Single family home   Number of Stairs, Within Home, Primary ten   Stair Railings, Within Home, Primary railings safe and in good condition   Bathroom Shower/Tub Walk-in shower   Equipment Currently Used at Home none   Support System   Support Systems Spouse/Significant Other   Do you have someone available to stay with you one or two nights once you are home? Yes       KAMALJIT Monaco, CNP   Advanced Practice Nurse Navigator- Orthopedics  [facility]   Phone: [phone]

## 2022-10-27 RX ORDER — ROSUVASTATIN CALCIUM 40 MG/1
40 TABLET, COATED ORAL DAILY
COMMUNITY
Start: 2022-10-17

## 2022-10-27 RX ORDER — DAPAGLIFLOZIN 10 MG/1
10 TABLET, FILM COATED ORAL DAILY
COMMUNITY
Start: 2022-10-17

## 2022-10-28 ENCOUNTER — HOSPITAL ENCOUNTER (OUTPATIENT)
Facility: CLINIC | Age: 52
Discharge: HOME | End: 2022-10-29
Attending: ORTHOPAEDIC SURGERY | Admitting: ORTHOPAEDIC SURGERY
Payer: COMMERCIAL

## 2022-10-28 ENCOUNTER — TRANSFERRED RECORDS (OUTPATIENT)
Dept: HEALTH INFORMATION MANAGEMENT | Facility: CLINIC | Age: 52
End: 2022-10-28

## 2022-10-28 ENCOUNTER — APPOINTMENT (OUTPATIENT)
Dept: PHYSICAL THERAPY | Facility: CLINIC | Age: 52
End: 2022-10-28
Attending: ORTHOPAEDIC SURGERY
Payer: COMMERCIAL

## 2022-10-28 ENCOUNTER — ANESTHESIA (OUTPATIENT)
Dept: SURGERY | Facility: CLINIC | Age: 52
End: 2022-10-28
Payer: COMMERCIAL

## 2022-10-28 ENCOUNTER — ANESTHESIA EVENT (OUTPATIENT)
Dept: SURGERY | Facility: CLINIC | Age: 52
End: 2022-10-28
Payer: COMMERCIAL

## 2022-10-28 DIAGNOSIS — M17.11 PRIMARY OSTEOARTHRITIS OF RIGHT KNEE: Primary | ICD-10-CM

## 2022-10-28 LAB
GLUCOSE BLDC GLUCOMTR-MCNC: 103 MG/DL (ref 70–99)
GLUCOSE BLDC GLUCOMTR-MCNC: 114 MG/DL (ref 70–99)
SARS-COV-2 RNA RESP QL NAA+PROBE: NEGATIVE

## 2022-10-28 PROCEDURE — 360N000077 HC SURGERY LEVEL 4, PER MIN: Performed by: ORTHOPAEDIC SURGERY

## 2022-10-28 PROCEDURE — 999N000141 HC STATISTIC PRE-PROCEDURE NURSING ASSESSMENT: Performed by: ORTHOPAEDIC SURGERY

## 2022-10-28 PROCEDURE — 250N000011 HC RX IP 250 OP 636: Performed by: ANESTHESIOLOGY

## 2022-10-28 PROCEDURE — 250N000013 HC RX MED GY IP 250 OP 250 PS 637: Performed by: ANESTHESIOLOGY

## 2022-10-28 PROCEDURE — 250N000009 HC RX 250: Performed by: NURSE ANESTHETIST, CERTIFIED REGISTERED

## 2022-10-28 PROCEDURE — 250N000011 HC RX IP 250 OP 636: Performed by: NURSE ANESTHETIST, CERTIFIED REGISTERED

## 2022-10-28 PROCEDURE — 250N000009 HC RX 250: Performed by: PHYSICIAN ASSISTANT

## 2022-10-28 PROCEDURE — 272N000001 HC OR GENERAL SUPPLY STERILE: Performed by: ORTHOPAEDIC SURGERY

## 2022-10-28 PROCEDURE — 258N000003 HC RX IP 258 OP 636: Performed by: ANESTHESIOLOGY

## 2022-10-28 PROCEDURE — 250N000011 HC RX IP 250 OP 636: Performed by: ORTHOPAEDIC SURGERY

## 2022-10-28 PROCEDURE — 370N000017 HC ANESTHESIA TECHNICAL FEE, PER MIN: Performed by: ORTHOPAEDIC SURGERY

## 2022-10-28 PROCEDURE — 258N000003 HC RX IP 258 OP 636: Performed by: NURSE ANESTHETIST, CERTIFIED REGISTERED

## 2022-10-28 PROCEDURE — 250N000011 HC RX IP 250 OP 636: Performed by: PHYSICIAN ASSISTANT

## 2022-10-28 PROCEDURE — 250N000013 HC RX MED GY IP 250 OP 250 PS 637: Performed by: PHYSICIAN ASSISTANT

## 2022-10-28 PROCEDURE — U0003 INFECTIOUS AGENT DETECTION BY NUCLEIC ACID (DNA OR RNA); SEVERE ACUTE RESPIRATORY SYNDROME CORONAVIRUS 2 (SARS-COV-2) (CORONAVIRUS DISEASE [COVID-19]), AMPLIFIED PROBE TECHNIQUE, MAKING USE OF HIGH THROUGHPUT TECHNOLOGIES AS DESCRIBED BY CMS-2020-01-R: HCPCS | Performed by: HOSPITALIST

## 2022-10-28 PROCEDURE — 82962 GLUCOSE BLOOD TEST: CPT

## 2022-10-28 PROCEDURE — C1713 ANCHOR/SCREW BN/BN,TIS/BN: HCPCS | Performed by: ORTHOPAEDIC SURGERY

## 2022-10-28 PROCEDURE — 250N000009 HC RX 250: Performed by: ANESTHESIOLOGY

## 2022-10-28 PROCEDURE — 99204 OFFICE O/P NEW MOD 45 MIN: CPT | Performed by: HOSPITALIST

## 2022-10-28 PROCEDURE — C1776 JOINT DEVICE (IMPLANTABLE): HCPCS | Performed by: ORTHOPAEDIC SURGERY

## 2022-10-28 PROCEDURE — 250N000013 HC RX MED GY IP 250 OP 250 PS 637: Performed by: ORTHOPAEDIC SURGERY

## 2022-10-28 PROCEDURE — 250N000013 HC RX MED GY IP 250 OP 250 PS 637: Performed by: HOSPITALIST

## 2022-10-28 PROCEDURE — 258N000003 HC RX IP 258 OP 636: Performed by: ORTHOPAEDIC SURGERY

## 2022-10-28 PROCEDURE — 97110 THERAPEUTIC EXERCISES: CPT | Mod: GP

## 2022-10-28 PROCEDURE — 97161 PT EVAL LOW COMPLEX 20 MIN: CPT | Mod: GP

## 2022-10-28 PROCEDURE — 710N000010 HC RECOVERY PHASE 1, LEVEL 2, PER MIN: Performed by: ORTHOPAEDIC SURGERY

## 2022-10-28 DEVICE — IMPLANTABLE DEVICE
Type: IMPLANTABLE DEVICE | Site: KNEE | Status: FUNCTIONAL
Brand: PERSONA®

## 2022-10-28 DEVICE — IMPLANTABLE DEVICE
Type: IMPLANTABLE DEVICE | Site: KNEE | Status: FUNCTIONAL
Brand: PERSONA® VIVACIT-E®

## 2022-10-28 DEVICE — IMPLANTABLE DEVICE
Type: IMPLANTABLE DEVICE | Site: KNEE | Status: FUNCTIONAL
Brand: PERSONA® NATURAL TIBIA®

## 2022-10-28 DEVICE — SIMPLEX® HV IS A FAST-SETTING ACRYLIC RESIN FOR USE IN BONE SURGERY. MIXING THE TWO SEPARATE STERILE COMPONENTS PRODUCES A DUCTILE BONE CEMENT WHICH, AFTER HARDENING, FIXES THE IMPLANT AND TRANSFERS STRESSES PRODUCED DURING MOVEMENT EVENLY TO THE BONE. SIMPLEX® HV CEMENT POWDER ALSO CONTAINS INSOLUBLE ZIRCONIUM DIOXIDE AS AN X-RAY CONTRAST MEDIUM. SIMPLEX® HV DOES NOT EMIT A SIGNAL AND DOES NOT POSE A SAFETY RISK IN A MAGNETIC RESONANCE ENVIRONMENT.
Type: IMPLANTABLE DEVICE | Site: KNEE | Status: FUNCTIONAL
Brand: SIMPLEX HV

## 2022-10-28 DEVICE — IMP PATELLA ZIM KNEE ALL POLLY 38MM 42-5400-000-38: Type: IMPLANTABLE DEVICE | Site: KNEE | Status: FUNCTIONAL

## 2022-10-28 RX ORDER — OXYCODONE HYDROCHLORIDE 5 MG/1
10 TABLET ORAL EVERY 4 HOURS PRN
Status: DISCONTINUED | OUTPATIENT
Start: 2022-10-28 | End: 2022-10-29 | Stop reason: HOSPADM

## 2022-10-28 RX ORDER — PROPOFOL 10 MG/ML
INJECTION, EMULSION INTRAVENOUS PRN
Status: DISCONTINUED | OUTPATIENT
Start: 2022-10-28 | End: 2022-10-28

## 2022-10-28 RX ORDER — LIDOCAINE 40 MG/G
CREAM TOPICAL
Status: DISCONTINUED | OUTPATIENT
Start: 2022-10-28 | End: 2022-10-29 | Stop reason: HOSPADM

## 2022-10-28 RX ORDER — ONDANSETRON 2 MG/ML
4 INJECTION INTRAMUSCULAR; INTRAVENOUS EVERY 30 MIN PRN
Status: DISCONTINUED | OUTPATIENT
Start: 2022-10-28 | End: 2022-10-28 | Stop reason: HOSPADM

## 2022-10-28 RX ORDER — MAGNESIUM SULFATE 4 G/50ML
4 INJECTION INTRAVENOUS ONCE
Status: COMPLETED | OUTPATIENT
Start: 2022-10-28 | End: 2022-10-28

## 2022-10-28 RX ORDER — SODIUM CHLORIDE, SODIUM LACTATE, POTASSIUM CHLORIDE, CALCIUM CHLORIDE 600; 310; 30; 20 MG/100ML; MG/100ML; MG/100ML; MG/100ML
INJECTION, SOLUTION INTRAVENOUS CONTINUOUS
Status: DISCONTINUED | OUTPATIENT
Start: 2022-10-28 | End: 2022-10-29 | Stop reason: HOSPADM

## 2022-10-28 RX ORDER — ONDANSETRON 2 MG/ML
INJECTION INTRAMUSCULAR; INTRAVENOUS PRN
Status: DISCONTINUED | OUTPATIENT
Start: 2022-10-28 | End: 2022-10-28

## 2022-10-28 RX ORDER — NALOXONE HYDROCHLORIDE 0.4 MG/ML
0.2 INJECTION, SOLUTION INTRAMUSCULAR; INTRAVENOUS; SUBCUTANEOUS
Status: DISCONTINUED | OUTPATIENT
Start: 2022-10-28 | End: 2022-10-29 | Stop reason: HOSPADM

## 2022-10-28 RX ORDER — HYDROMORPHONE HCL IN WATER/PF 6 MG/30 ML
0.4 PATIENT CONTROLLED ANALGESIA SYRINGE INTRAVENOUS
Status: DISCONTINUED | OUTPATIENT
Start: 2022-10-28 | End: 2022-10-29 | Stop reason: HOSPADM

## 2022-10-28 RX ORDER — OXYCODONE HYDROCHLORIDE 5 MG/1
5-10 TABLET ORAL EVERY 4 HOURS PRN
Qty: 30 TABLET | Refills: 0 | Status: SHIPPED | OUTPATIENT
Start: 2022-10-28

## 2022-10-28 RX ORDER — CEFAZOLIN SODIUM/WATER 3 G/30 ML
3 SYRINGE (ML) INTRAVENOUS
Status: DISCONTINUED | OUTPATIENT
Start: 2022-10-28 | End: 2022-10-28 | Stop reason: HOSPADM

## 2022-10-28 RX ORDER — CEFAZOLIN SODIUM/WATER 3 G/30 ML
3 SYRINGE (ML) INTRAVENOUS SEE ADMIN INSTRUCTIONS
Status: DISCONTINUED | OUTPATIENT
Start: 2022-10-28 | End: 2022-10-28 | Stop reason: HOSPADM

## 2022-10-28 RX ORDER — PROPOFOL 10 MG/ML
INJECTION, EMULSION INTRAVENOUS
Status: DISCONTINUED
Start: 2022-10-28 | End: 2022-10-28 | Stop reason: HOSPADM

## 2022-10-28 RX ORDER — BUPIVACAINE HYDROCHLORIDE 7.5 MG/ML
INJECTION, SOLUTION INTRASPINAL
Status: COMPLETED | OUTPATIENT
Start: 2022-10-28 | End: 2022-10-28

## 2022-10-28 RX ORDER — ACETAMINOPHEN 325 MG/1
650 TABLET ORAL EVERY 4 HOURS PRN
Status: DISCONTINUED | OUTPATIENT
Start: 2022-10-31 | End: 2022-10-29 | Stop reason: HOSPADM

## 2022-10-28 RX ORDER — OXYCODONE HYDROCHLORIDE 5 MG/1
5 TABLET ORAL EVERY 4 HOURS PRN
Status: DISCONTINUED | OUTPATIENT
Start: 2022-10-28 | End: 2022-10-28 | Stop reason: HOSPADM

## 2022-10-28 RX ORDER — FENTANYL CITRATE 50 UG/ML
50 INJECTION, SOLUTION INTRAMUSCULAR; INTRAVENOUS
Status: COMPLETED | OUTPATIENT
Start: 2022-10-28 | End: 2022-10-28

## 2022-10-28 RX ORDER — NALOXONE HYDROCHLORIDE 0.4 MG/ML
0.4 INJECTION, SOLUTION INTRAMUSCULAR; INTRAVENOUS; SUBCUTANEOUS
Status: DISCONTINUED | OUTPATIENT
Start: 2022-10-28 | End: 2022-10-29 | Stop reason: HOSPADM

## 2022-10-28 RX ORDER — TRANEXAMIC ACID 650 MG/1
1950 TABLET ORAL ONCE
Status: COMPLETED | OUTPATIENT
Start: 2022-10-28 | End: 2022-10-28

## 2022-10-28 RX ORDER — ASPIRIN 81 MG/1
81 TABLET ORAL 2 TIMES DAILY
Qty: 60 TABLET | Refills: 0 | Status: SHIPPED | OUTPATIENT
Start: 2022-10-28

## 2022-10-28 RX ORDER — ONDANSETRON 4 MG/1
4 TABLET, ORALLY DISINTEGRATING ORAL EVERY 6 HOURS PRN
Status: DISCONTINUED | OUTPATIENT
Start: 2022-10-28 | End: 2022-10-29 | Stop reason: HOSPADM

## 2022-10-28 RX ORDER — ONDANSETRON 4 MG/1
4 TABLET, ORALLY DISINTEGRATING ORAL EVERY 30 MIN PRN
Status: DISCONTINUED | OUTPATIENT
Start: 2022-10-28 | End: 2022-10-28 | Stop reason: HOSPADM

## 2022-10-28 RX ORDER — BUPIVACAINE HYDROCHLORIDE 5 MG/ML
INJECTION, SOLUTION EPIDURAL; INTRACAUDAL
Status: COMPLETED | OUTPATIENT
Start: 2022-10-28 | End: 2022-10-28

## 2022-10-28 RX ORDER — CEFAZOLIN SODIUM 2 G/100ML
2 INJECTION, SOLUTION INTRAVENOUS EVERY 8 HOURS
Status: COMPLETED | OUTPATIENT
Start: 2022-10-28 | End: 2022-10-29

## 2022-10-28 RX ORDER — AMOXICILLIN 250 MG
1-2 CAPSULE ORAL 2 TIMES DAILY
Qty: 30 TABLET | Refills: 0 | Status: SHIPPED | OUTPATIENT
Start: 2022-10-28

## 2022-10-28 RX ORDER — ASPIRIN 81 MG/1
81 TABLET ORAL 2 TIMES DAILY
Status: DISCONTINUED | OUTPATIENT
Start: 2022-10-28 | End: 2022-10-29 | Stop reason: HOSPADM

## 2022-10-28 RX ORDER — POLYETHYLENE GLYCOL 3350 17 G/17G
17 POWDER, FOR SOLUTION ORAL DAILY
Status: DISCONTINUED | OUTPATIENT
Start: 2022-10-29 | End: 2022-10-29 | Stop reason: HOSPADM

## 2022-10-28 RX ORDER — BISACODYL 10 MG
10 SUPPOSITORY, RECTAL RECTAL DAILY PRN
Status: DISCONTINUED | OUTPATIENT
Start: 2022-10-28 | End: 2022-10-29 | Stop reason: HOSPADM

## 2022-10-28 RX ORDER — SODIUM CHLORIDE, SODIUM LACTATE, POTASSIUM CHLORIDE, CALCIUM CHLORIDE 600; 310; 30; 20 MG/100ML; MG/100ML; MG/100ML; MG/100ML
INJECTION, SOLUTION INTRAVENOUS CONTINUOUS
Status: DISCONTINUED | OUTPATIENT
Start: 2022-10-28 | End: 2022-10-28 | Stop reason: HOSPADM

## 2022-10-28 RX ORDER — HYDROXYZINE HYDROCHLORIDE 25 MG/1
25 TABLET, FILM COATED ORAL EVERY 6 HOURS PRN
Qty: 30 TABLET | Refills: 0 | Status: SHIPPED | OUTPATIENT
Start: 2022-10-28

## 2022-10-28 RX ORDER — PANTOPRAZOLE SODIUM 20 MG/1
40 TABLET, DELAYED RELEASE ORAL
Status: DISCONTINUED | OUTPATIENT
Start: 2022-10-29 | End: 2022-10-29 | Stop reason: HOSPADM

## 2022-10-28 RX ORDER — ACETAMINOPHEN 325 MG/1
975 TABLET ORAL EVERY 8 HOURS
Status: DISCONTINUED | OUTPATIENT
Start: 2022-10-28 | End: 2022-10-29 | Stop reason: HOSPADM

## 2022-10-28 RX ORDER — DAPAGLIFLOZIN 5 MG/1
10 TABLET, FILM COATED ORAL DAILY
Status: DISCONTINUED | OUTPATIENT
Start: 2022-10-28 | End: 2022-10-29 | Stop reason: HOSPADM

## 2022-10-28 RX ORDER — ROSUVASTATIN CALCIUM 10 MG/1
40 TABLET, COATED ORAL DAILY
Status: DISCONTINUED | OUTPATIENT
Start: 2022-10-28 | End: 2022-10-29 | Stop reason: HOSPADM

## 2022-10-28 RX ORDER — ONDANSETRON 2 MG/ML
4 INJECTION INTRAMUSCULAR; INTRAVENOUS EVERY 6 HOURS PRN
Status: DISCONTINUED | OUTPATIENT
Start: 2022-10-28 | End: 2022-10-29 | Stop reason: HOSPADM

## 2022-10-28 RX ORDER — MAGNESIUM SULFATE 4 G/50ML
4 INJECTION INTRAVENOUS ONCE
Status: DISCONTINUED | OUTPATIENT
Start: 2022-10-28 | End: 2022-10-28 | Stop reason: HOSPADM

## 2022-10-28 RX ORDER — PROCHLORPERAZINE MALEATE 10 MG
10 TABLET ORAL EVERY 6 HOURS PRN
Status: DISCONTINUED | OUTPATIENT
Start: 2022-10-28 | End: 2022-10-29 | Stop reason: HOSPADM

## 2022-10-28 RX ORDER — AMOXICILLIN 250 MG
1 CAPSULE ORAL 2 TIMES DAILY
Status: DISCONTINUED | OUTPATIENT
Start: 2022-10-28 | End: 2022-10-29 | Stop reason: HOSPADM

## 2022-10-28 RX ORDER — HYDROMORPHONE HCL IN WATER/PF 6 MG/30 ML
0.2 PATIENT CONTROLLED ANALGESIA SYRINGE INTRAVENOUS
Status: DISCONTINUED | OUTPATIENT
Start: 2022-10-28 | End: 2022-10-29 | Stop reason: HOSPADM

## 2022-10-28 RX ORDER — LIDOCAINE HYDROCHLORIDE 10 MG/ML
INJECTION, SOLUTION INFILTRATION; PERINEURAL PRN
Status: DISCONTINUED | OUTPATIENT
Start: 2022-10-28 | End: 2022-10-28

## 2022-10-28 RX ORDER — DEXAMETHASONE SODIUM PHOSPHATE 4 MG/ML
INJECTION, SOLUTION INTRA-ARTICULAR; INTRALESIONAL; INTRAMUSCULAR; INTRAVENOUS; SOFT TISSUE PRN
Status: DISCONTINUED | OUTPATIENT
Start: 2022-10-28 | End: 2022-10-28

## 2022-10-28 RX ORDER — PROPOFOL 10 MG/ML
INJECTION, EMULSION INTRAVENOUS CONTINUOUS PRN
Status: DISCONTINUED | OUTPATIENT
Start: 2022-10-28 | End: 2022-10-28

## 2022-10-28 RX ORDER — FENTANYL CITRATE 50 UG/ML
25 INJECTION, SOLUTION INTRAMUSCULAR; INTRAVENOUS EVERY 5 MIN PRN
Status: DISCONTINUED | OUTPATIENT
Start: 2022-10-28 | End: 2022-10-28 | Stop reason: HOSPADM

## 2022-10-28 RX ORDER — LIDOCAINE 40 MG/G
CREAM TOPICAL
Status: DISCONTINUED | OUTPATIENT
Start: 2022-10-28 | End: 2022-10-28 | Stop reason: HOSPADM

## 2022-10-28 RX ORDER — HYDROMORPHONE HCL IN WATER/PF 6 MG/30 ML
0.2 PATIENT CONTROLLED ANALGESIA SYRINGE INTRAVENOUS EVERY 5 MIN PRN
Status: DISCONTINUED | OUTPATIENT
Start: 2022-10-28 | End: 2022-10-28 | Stop reason: HOSPADM

## 2022-10-28 RX ORDER — ACETAMINOPHEN 325 MG/1
975 TABLET ORAL ONCE
Status: COMPLETED | OUTPATIENT
Start: 2022-10-28 | End: 2022-10-28

## 2022-10-28 RX ORDER — OXYCODONE HYDROCHLORIDE 5 MG/1
5 TABLET ORAL EVERY 4 HOURS PRN
Status: DISCONTINUED | OUTPATIENT
Start: 2022-10-28 | End: 2022-10-29 | Stop reason: HOSPADM

## 2022-10-28 RX ADMIN — MIDAZOLAM HYDROCHLORIDE 1 MG: 1 INJECTION, SOLUTION INTRAMUSCULAR; INTRAVENOUS at 08:39

## 2022-10-28 RX ADMIN — ACETAMINOPHEN 975 MG: 325 TABLET, FILM COATED ORAL at 07:26

## 2022-10-28 RX ADMIN — SODIUM CHLORIDE, POTASSIUM CHLORIDE, SODIUM LACTATE AND CALCIUM CHLORIDE: 600; 310; 30; 20 INJECTION, SOLUTION INTRAVENOUS at 10:39

## 2022-10-28 RX ADMIN — Medication 3 G: at 10:01

## 2022-10-28 RX ADMIN — CEFAZOLIN SODIUM 2 G: 2 INJECTION, SOLUTION INTRAVENOUS at 17:46

## 2022-10-28 RX ADMIN — BUPIVACAINE HYDROCHLORIDE IN DEXTROSE 1.8 ML: 7.5 INJECTION, SOLUTION SUBARACHNOID at 10:10

## 2022-10-28 RX ADMIN — PHENYLEPHRINE HYDROCHLORIDE 100 MCG: 10 INJECTION INTRAVENOUS at 10:55

## 2022-10-28 RX ADMIN — PROPOFOL 20 MG: 10 INJECTION, EMULSION INTRAVENOUS at 10:12

## 2022-10-28 RX ADMIN — PROPOFOL 30 MG: 10 INJECTION, EMULSION INTRAVENOUS at 10:20

## 2022-10-28 RX ADMIN — PROPOFOL 30 MG: 10 INJECTION, EMULSION INTRAVENOUS at 10:28

## 2022-10-28 RX ADMIN — PROPOFOL 50 MCG/KG/MIN: 10 INJECTION, EMULSION INTRAVENOUS at 10:17

## 2022-10-28 RX ADMIN — ACETAMINOPHEN 975 MG: 325 TABLET, FILM COATED ORAL at 16:41

## 2022-10-28 RX ADMIN — ACETAMINOPHEN 975 MG: 325 TABLET, FILM COATED ORAL at 23:36

## 2022-10-28 RX ADMIN — ONDANSETRON 4 MG: 2 INJECTION INTRAMUSCULAR; INTRAVENOUS at 10:05

## 2022-10-28 RX ADMIN — MIDAZOLAM HYDROCHLORIDE 1 MG: 1 INJECTION, SOLUTION INTRAMUSCULAR; INTRAVENOUS at 08:41

## 2022-10-28 RX ADMIN — PHENYLEPHRINE HYDROCHLORIDE 100 MCG: 10 INJECTION INTRAVENOUS at 11:39

## 2022-10-28 RX ADMIN — SODIUM CHLORIDE, POTASSIUM CHLORIDE, SODIUM LACTATE AND CALCIUM CHLORIDE: 600; 310; 30; 20 INJECTION, SOLUTION INTRAVENOUS at 17:46

## 2022-10-28 RX ADMIN — PROPOFOL 40 MG: 10 INJECTION, EMULSION INTRAVENOUS at 10:07

## 2022-10-28 RX ADMIN — DEXAMETHASONE SODIUM PHOSPHATE 4 MG: 4 INJECTION, SOLUTION INTRA-ARTICULAR; INTRALESIONAL; INTRAMUSCULAR; INTRAVENOUS; SOFT TISSUE at 10:23

## 2022-10-28 RX ADMIN — MAGNESIUM SULFATE HEPTAHYDRATE 4 G: 4 INJECTION, SOLUTION INTRAVENOUS at 07:42

## 2022-10-28 RX ADMIN — ASPIRIN 81 MG: 81 TABLET, COATED ORAL at 13:54

## 2022-10-28 RX ADMIN — TRANEXAMIC ACID 1950 MG: 650 TABLET ORAL at 07:23

## 2022-10-28 RX ADMIN — ROSUVASTATIN CALCIUM 40 MG: 10 TABLET, FILM COATED ORAL at 16:42

## 2022-10-28 RX ADMIN — SODIUM CHLORIDE, POTASSIUM CHLORIDE, SODIUM LACTATE AND CALCIUM CHLORIDE: 600; 310; 30; 20 INJECTION, SOLUTION INTRAVENOUS at 07:47

## 2022-10-28 RX ADMIN — LIDOCAINE HYDROCHLORIDE 20 MG: 10 INJECTION, SOLUTION INFILTRATION; PERINEURAL at 10:05

## 2022-10-28 RX ADMIN — PHENYLEPHRINE HYDROCHLORIDE 100 MCG: 10 INJECTION INTRAVENOUS at 10:58

## 2022-10-28 RX ADMIN — Medication 5 ML: at 08:39

## 2022-10-28 RX ADMIN — SENNOSIDES AND DOCUSATE SODIUM 1 TABLET: 50; 8.6 TABLET ORAL at 20:11

## 2022-10-28 RX ADMIN — DAPAGLIFLOZIN 10 MG: 5 TABLET, FILM COATED ORAL at 16:41

## 2022-10-28 RX ADMIN — FENTANYL CITRATE 50 MCG: 50 INJECTION, SOLUTION INTRAMUSCULAR; INTRAVENOUS at 08:39

## 2022-10-28 RX ADMIN — OXYCODONE HYDROCHLORIDE 5 MG: 5 TABLET ORAL at 22:39

## 2022-10-28 RX ADMIN — ASPIRIN 81 MG: 81 TABLET, COATED ORAL at 20:11

## 2022-10-28 RX ADMIN — BUPIVACAINE HYDROCHLORIDE 15 ML: 5 INJECTION, SOLUTION EPIDURAL; INTRACAUDAL; PERINEURAL at 08:47

## 2022-10-28 RX ADMIN — SENNOSIDES AND DOCUSATE SODIUM 1 TABLET: 50; 8.6 TABLET ORAL at 13:54

## 2022-10-28 RX ADMIN — FENTANYL CITRATE 50 MCG: 50 INJECTION, SOLUTION INTRAMUSCULAR; INTRAVENOUS at 08:41

## 2022-10-28 ASSESSMENT — ACTIVITIES OF DAILY LIVING (ADL)
ADLS_ACUITY_SCORE: 22
ADLS_ACUITY_SCORE: 22
ADLS_ACUITY_SCORE: 25
ADLS_ACUITY_SCORE: 22
ADLS_ACUITY_SCORE: 18
ADLS_ACUITY_SCORE: 25
ADLS_ACUITY_SCORE: 25

## 2022-10-28 NOTE — OP NOTE
Operative Report    PATIENT:  Aurelio Batista Jr.    DATE OF SURGERY:  10/28/2022    SURGEON  Hunter Laguna MD.      FIRST ASSISTANT  William Sosa PA-C  (Expert MELA assist was required throughout for patient positioning, soft tissue retraction, appropriate use of knee instrumentation, and patient safety)     PREOPERATIVE DIAGNOSIS  right knee osteoarthritis     POSTOPERATIVE DIAGNOSIS  right knee osteoarthritis.         PROCEDURE  right Total Knee Arthroplasty.         ANESTHESIA  Spinal    SPECIMENS: none     ESTIMATED BLOOD LOSS  50cc     INDICATIONS  Mr. Aurelio Batista Jr. is a pleasant 51 year old-year-old male with an ongoing history of increasing and progressive pain in the right knee with severe disability. Pain and disability due to knee arthritis are severely affecting quality of life and ability to perform even simple activities of daily living.  X-rays have shown bone-on-bone degenerative change. Consequently after trying and failing all conservative management of knee arthritis, discussion regarding the risk and benefits of knee replacement was undertaken and the patient elected to proceed.     FINDINGS:  The operative knee showed a severe full-thickness cartilage loss on the femur and tibia in the medial compartment of the knee.  The patellofemoral joint also showed advanced arthritic changes.      IMPLANTS  1. Briseyda, Persona, CR femoral component, size 9 .  2. Briseyda, Persona, tibial component, size G.  3. Briseyda, Persona,  all polyethylene articular surface 14 mm thickness.  MC  4. Briseyda, Persona, all polyethylene patellar button, 38mm diameter      PROCEDURE  Once consent was obtained and the operative site marked in the preop holding area, the patient was brought to the operating room.  Anesthesia was established without difficulty. All bony prominences and the non-operative leg were padded appropriately. The right leg was sterilely prepped and draped in the usual fashion after placement  of a proximal tourniquet.  Tourniquet was  inflated.    A longitudinal incision made over the knee.  Dissection was carried down through the extensor mechanism.  A medial parapatellar arthrotomy  was performed.  The patella was luxed laterally and protected. Standard medial release performed.    An intramedullary guide was used in the femur.  The distal femoral was made at 4 degrees of valgus.  The femoral cutting block  was applied and the rest of the femoral cuts completed. ACL was removed and the PCL was recessed.    Attention was then turned to the tibia.  This was cut using an extramedullary tibial cutting guide perpendicular to its mechanical axis.  The trial tibial and femoral components were then placed and the knee reduced. The patella was resurfaced and found to track well. Flexion and extension gaps were appropriate and varus valgus stability was good.     The knee was copiously irrigated and all bony surfaces dried.  Cement was mixed and all components were cemented and held until firm.  The knee was again trialed.  The  Polyethylene was opened and snapped into place, the locking mechanism was ensured.  The knee was copiously irrigated. The extensor mechanism was closed with # 1 interrupted Vicryl suture. Deep dermis was closed layer-wise of # 2-0 interrupted inverted Vicryl sutures followed by running # 3-0 Monocryl in the skin.  Dressings were applied. The patient tolerated the procedure well and was returned to the postop recovery area in stable condition.          HUNTER ESCUDERO MD    @C(1)@  Hunter Escudero MD    @C(2)@  Gavin Robles

## 2022-10-28 NOTE — ANESTHESIA PROCEDURE NOTES
Adductor canal Procedure Note    Pre-Procedure   Staff -        Anesthesiologist:  Washington Cervantes MD       Performed By: anesthesiologist       Location: pre-op       Procedure Start/Stop Times: 10/28/2022 8:47 AM and 10/28/2022 8:49 AM       Pre-Anesthestic Checklist: patient identified, IV checked, site marked, risks and benefits discussed, informed consent, monitors and equipment checked, pre-op evaluation, at physician/surgeon's request and post-op pain management  Timeout:       Correct Patient: Yes        Correct Procedure: Yes        Correct Site: Yes        Correct Position: Yes        Correct Laterality: Yes        Site Marked: Yes  Procedure Documentation  Procedure: Adductor canal       Diagnosis: POST OP PAIN TOTAL KNEE REPLACEMENT       Laterality: right       Patient Position: supine       Patient Prep/Sterile Barriers: sterile gloves, mask       Skin prep: Chloraprep       Local skin infiltrated with 2 mL of 1% lidocaine.        Needle Type: short bevel       Needle Gauge: 20.        Needle Length (Inches): 4        Ultrasound guided       1. Ultrasound was used to identify targeted nerve, plexus, vascular marker, or fascial plane and place a needle adjacent to it in real-time.       2. Ultrasound was used to visualize the spread of anesthetic in close proximity to the above referenced structure.       3. A permanent image is entered into the patient's record.       4. The visualized anatomic structures appeared normal.       5. There were no apparent abnormal pathologic findings.    Assessment/Narrative         The placement was negative for: blood aspirated, painful injection and site bleeding       Paresthesias: No.       Bolus given via needle..        Secured via.        Insertion/Infusion Method: Single Shot       Complications: none       Injection made incrementally with aspirations every 5 mL.    Medication(s) Administered   Bupivacaine 0.5% PF (Infiltration) - Infiltration   15 mL -  "10/28/2022 8:47:00 AM  Medication Administration Time: 10/28/2022 8:47 AM      FOR [facility] ONLY:   Pain Team Contact information: please page the Pain Team Via [employer]. Search \"Pain\". During daytime hours, please page the attending first. At night please page the resident first.    "

## 2022-10-28 NOTE — PHARMACY-ADMISSION MEDICATION HISTORY
Pharmacy Note - Admission Medication History    Pertinent Provider Information:   ______________________________________________________________________    Prior To Admission (PTA) med list completed and updated in EMR.       PTA Med List   Medication Sig Last Dose     dapagliflozin (FARXIGA) 10 MG TABS tablet Take 10 mg by mouth daily 10/27/2022     metFORMIN (GLUCOPHAGE) 500 MG tablet Take 500 mg by mouth daily (with breakfast) 10/27/2022     omeprazole (PRILOSEC) 20 MG DR capsule Take 20 mg by mouth daily 10/28/2022     rosuvastatin (CRESTOR) 40 MG tablet Take 40 mg by mouth daily 10/27/2022       Information source(s): Patient, Prescription bottles and CareEverywhere/SureScripts    Method of interview communication: in-person    Patient was asked about OTC/herbal products specifically.  PTA med list reflects this.    Based on the pharmacist's assessment, the PTA med list information appears reliable    Allergies were reviewed, assessed, and updated with the patient.      Patient did not bring any medications to the hospital and can't retrieve from home. No multi-dose medications are available for use during hospital stay.      Thank you for the opportunity to participate in the care of this patient.      Jules Heck RPH     10/28/2022     7:23 AM

## 2022-10-28 NOTE — ANESTHESIA POSTPROCEDURE EVALUATION
Patient: Aurelio GOOD Lynne Irwin    Procedure: Procedure(s):  RIGHT TOTAL KNEE ARTHROPLASTY       Anesthesia Type:  Spinal    Note:  Disposition: Inpatient   Postop Pain Control: Uneventful            Sign Out: Well controlled pain   PONV: No   Neuro/Psych: Uneventful            Sign Out: Acceptable/Baseline neuro status   Airway/Respiratory: Uneventful            Sign Out: Acceptable/Baseline resp. status   CV/Hemodynamics: Uneventful            Sign Out: Acceptable CV status; No obvious hypovolemia; No obvious fluid overload   Other NRE: NONE   DID A NON-ROUTINE EVENT OCCUR? No           Last vitals:  Vitals Value Taken Time   /68 10/28/22 1220   Temp 36.2  C (97.2  F) 10/28/22 1200   Pulse 58 10/28/22 1222   Resp 15 10/28/22 1222   SpO2 96 % 10/28/22 1222   Vitals shown include unvalidated device data.    Electronically Signed By: Madi Phelps MD  October 28, 2022  5:14 PM

## 2022-10-28 NOTE — ANESTHESIA PROCEDURE NOTES
Tibial Procedure Note    Pre-Procedure   Staff -        Anesthesiologist:  Washington Cervantes MD       Performed By: anesthesiologist       Location: pre-op       Procedure Start/Stop Times: 10/28/2022 8:39 AM and 10/28/2022 8:46 AM       Pre-Anesthestic Checklist: patient identified, site marked, risks and benefits discussed, informed consent, monitors and equipment checked, pre-op evaluation, at physician/surgeon's request and post-op pain management  Timeout:       Correct Patient: Yes        Correct Procedure: Yes        Correct Site: Yes        Correct Position: Yes        Correct Laterality: Yes        Site Marked: Yes  Procedure Documentation  Procedure: Tibial       Diagnosis: POST OP PAIN TOTLA KNEE REPLACEMENT       Laterality: right       Patient Position: supine       Patient Prep/Sterile Barriers: sterile gloves, mask       Skin prep: Chloraprep       Local skin infiltrated with 2 mL of 1% lidocaine.        Needle Type: short bevel       Needle Gauge: 20.        Needle Length (Inches): 4        Ultrasound guided       1. Ultrasound was used to identify targeted nerve, plexus, vascular marker, or fascial plane and place a needle adjacent to it in real-time.       2. Ultrasound was used to visualize the spread of anesthetic in close proximity to the above referenced structure.       3. A permanent image is entered into the patient's record.       4. The visualized anatomic structures appeared normal.       5. There were no apparent abnormal pathologic findings.    Assessment/Narrative         The placement was negative for: blood aspirated, painful injection and site bleeding       Paresthesias: No.       Bolus given via needle..        Secured via.        Insertion/Infusion Method: Single Shot    Medication(s) Administered   Medication Administration Time: 10/28/2022 8:39 AM     Comments:  0.125% bupivicaine 5 l selective tibial nerve block      FOR [facility] ([facility] ONLY:   Pain Team Contact  "information: please page the Pain Team Via [facility]. Search \"Pain\". During daytime hours, please page the attending first. At night please page the resident first.    "

## 2022-10-28 NOTE — ANESTHESIA PROCEDURE NOTES
"Intrathecal injection Procedure Note    Pre-Procedure   Staff -        Anesthesiologist:  Washington Cervantes MD       Performed By: anesthesiologist       Location: OR       Procedure Start/Stop Times: 10/28/2022 10:10 AM and 10/28/2022 10:16 AM       Pre-Anesthestic Checklist: patient identified, risks and benefits discussed, informed consent, monitors and equipment checked and pre-op evaluation  Timeout:       Correct Patient: Yes        Correct Procedure: Yes        Correct Site: Yes        Correct Position: Yes   Procedure Documentation  Procedure: intrathecal injection       Diagnosis: knee replacement       Patient Position: sitting       Patient Prep/Sterile Barriers: sterile gloves, mask, patient draped       Skin prep: Chloraprep       Insertion Site: L3-4. (midline approach).       Needle Gauge: 24.        Needle Length (Inches): 4        # of attempts: 2 and  # of redirects:  2    Assessment/Narrative         Paresthesias: No.       CSF fluid: clear.    Medication(s) Administered   0.75% Hyperbaric Bupivacaine (Intrathecal) - Intrathecal   1.8 mL - 10/28/2022 10:10:00 AM  Medication Administration Time: 10/28/2022 10:10 AM      FOR [facility] ([facility] ONLY:   Pain Team Contact information: please page the Pain Team Via [employer]. Search \"Pain\". During daytime hours, please page the attending first. At night please page the resident first.    "

## 2022-10-28 NOTE — ANESTHESIA PREPROCEDURE EVALUATION
Anesthesia Pre-Procedure Evaluation    Patient: Aurelio Batista Jr.   MRN: [id number] : 1970        Procedure : Procedure(s):  RIGHT TOTAL KNEE ARTHROPLASTY          Past Medical History:   Diagnosis Date     Arthritis      Diabetes (H)      Gastroesophageal reflux disease      Hypertension      Obese      PONV (postoperative nausea and vomiting)       Past Surgical History:   Procedure Laterality Date     ANTERIOR CRUCIATE LIGAMENT REPAIR Left      APPENDECTOMY            HC KNEE SCOPE,SINGLE MENISECTOMY Right 2016    Procedure: RIGHT KNEE ARTHROPSCOPY WITH PARTIAL MEDIAL AND LATERAL MENISECTOMY, ABRASION CHONDROPLASTY PATELLOFEMORAL AND REMOVAL LOOSE BODY;  Surgeon: David Garcia MD;  Location: [facility] OR;  Service: Orthopedics     INGUINAL HERNIA REPAIR Bilateral      LAPAROSCOPIC CHOLECYSTECTOMY N/A 3/25/2015    Procedure: CHOLECYSTECTOMY LAPAROSCOPIC;  Surgeon: Yoandy Ventura MD;  Location: [facility] OR;  Service:      MENISCECTOMY Right 2016     PILONIDAL CYST DRAINAGE            VASECTOMY             Allergies   Allergen Reactions     Codeine Nausea and Vomiting     GI upset        Social History     Tobacco Use     Smoking status: Never     Smokeless tobacco: Never   Substance Use Topics     Alcohol use: Not Currently      Wt Readings from Last 1 Encounters:   10/28/22 126.3 kg (278 lb 6.4 oz)        Anesthesia Evaluation            ROS/MED HX  ENT/Pulmonary:  - neg pulmonary ROS     Neurologic:  - neg neurologic ROS     Cardiovascular:     (+) Dyslipidemia hypertension-----    METS/Exercise Tolerance:     Hematologic:  - neg hematologic  ROS     Musculoskeletal:  - neg musculoskeletal ROS     GI/Hepatic:     (+) GERD, Asymptomatic on medication,     Renal/Genitourinary:       Endo:     (+) type II DM, Obesity (BMI 40),     Psychiatric/Substance Use:  - neg psychiatric ROS     Infectious Disease:       Malignancy:  - neg malignancy ROS     Other:  - neg other ROS           Physical Exam    Airway        Mallampati: II   TM distance: > 3 FB   Neck ROM: full   Mouth opening: > 3 cm    Respiratory Devices and Support         Dental  no notable dental history         Cardiovascular   cardiovascular exam normal       Rhythm and rate: regular and normal     Pulmonary   pulmonary exam normal        breath sounds clear to auscultation           OUTSIDE LABS:  CBC: No results found for: WBC, HGB, HCT, PLT  BMP:   Lab Results   Component Value Date     10/27/2021     11/13/2020    POTASSIUM 5.0 10/27/2021    POTASSIUM 4.4 11/13/2020    CHLORIDE 106 10/27/2021    CHLORIDE 105 11/13/2020    CO2 28 10/27/2021    CO2 30 11/13/2020    BUN 16 10/27/2021    BUN 16 11/13/2020    CR 0.93 10/27/2021    CR 1.00 11/13/2020     (H) 10/28/2022     10/27/2021     COAGS: No results found for: PTT, INR, FIBR  POC: No results found for: BGM, HCG, HCGS  HEPATIC:   Lab Results   Component Value Date    ALBUMIN 3.9 10/27/2021    PROTTOTAL 6.4 10/27/2021    ALT 45 10/27/2021    AST 20 10/27/2021    ALKPHOS 82 10/27/2021    BILITOTAL 1.7 (H) 10/27/2021     OTHER:   Lab Results   Component Value Date    CLINT 9.4 10/27/2021    MAG 1.9 11/13/2020       Anesthesia Plan    ASA Status:  3      Anesthesia Type: Spinal.         Techniques and Equipment:       - Drips/Meds: Ketamine     Consents            Postoperative Care    Pain management: IV analgesics, Peripheral nerve block (Single Shot).   PONV prophylaxis: Ondansetron (or other 5HT-3)     Comments:    Other Comments: Magnesium.  Selective tibial nerve/adductor blocks for post op pain control req by surgeon            Washington Cervantes MD

## 2022-10-28 NOTE — INTERVAL H&P NOTE
"I have reviewed the surgical (or preoperative) H&P that is linked to this encounter, and examined the patient. There are no significant changes    Clinical Conditions Present on Arrival:  Clinically Significant Risk Factors Present on Admission                    # Obesity: Estimated body mass index is 39.95 kg/m  as calculated from the following:    Height as of this encounter: 1.778 m (5' 10\").    Weight as of this encounter: 126.3 kg (278 lb 6.4 oz).       "

## 2022-10-28 NOTE — PROGRESS NOTES
10/28/22 1430   Appointment Info   Signing Clinician's Name / Credentials (PT) Kymberly Robertson PT   Quick Adds   Quick Adds Certification   Living Environment   People in Home spouse   Current Living Arrangements house   Home Accessibility stairs within home   Number of Stairs, Within Home, Primary ten   Stair Railings, Within Home, Primary other (see comments)  (son to be putting in railing)   Self-Care   Equipment Currently Used at Home crutches   Activity/Exercise/Self-Care Comment amb. no AD; independent ADL's/IADL's; works full time   General Information   Onset of Illness/Injury or Date of Surgery 10/28/22   Referring Physician Dr. Laguna   Patient/Family Therapy Goals Statement (PT) walk easier   Pertinent History of Current Problem (include personal factors and/or comorbidities that impact the POC) s/p R TKA 10/28; PMH of DM, HLD, GERD, obesity   Existing Precautions/Restrictions no known precautions/restrictions   Cognition   Affect/Mental Status (Cognition) WFL   Orientation Status (Cognition) oriented x 4   Follows Commands (Cognition) WFL   Range of Motion (ROM)   ROM Comment R knee AROM 0-95; others wfl   Strength (Manual Muscle Testing)   Strength Comments decreased R knee strength s/p TKA   Transfers   Transfers sit-stand transfer   Sit-Stand Transfer   Sit-Stand Webster (Transfers) supervision;verbal cues;nonverbal cues (demo/gesture)   Assistive Device (Sit-Stand Transfers) walker, front-wheeled   Gait/Stairs (Locomotion)   Webster Level (Gait) contact guard;verbal cues;nonverbal cues (demo/gesture)   Assistive Device (Gait) walker, front-wheeled   Distance in Feet (Required for LE Total Joints) 80   Pattern (Gait) step-through   Deviations/Abnormal Patterns (Gait) antalgic;weight shifting decreased;gait speed decreased   Clinical Impression   Criteria for Skilled Therapeutic Intervention Yes, treatment indicated   PT Diagnosis (PT) impaired functional mobility   Influenced by the following  impairments decreased rom, strength, balance; pain   Functional limitations due to impairments gait, transfers, stairs   Clinical Presentation (PT Evaluation Complexity) Stable/Uncomplicated   Clinical Presentation Rationale pt presents as medically diagnosed   Clinical Decision Making (Complexity) low complexity   Planned Therapy Interventions (PT) balance training;bed mobility training;gait training;home exercise program;patient/family education;stair training;strengthening;stretching;transfer training   Anticipated Equipment Needs at Discharge (PT) walker, rolling   Risk & Benefits of therapy have been explained evaluation/treatment results reviewed;patient   PT Total Evaluation Time   PT Eval, Low Complexity Minutes (33452) 10   Plan of Care Review   Plan of Care Reviewed With patient   Therapy Certification   Start of care date 10/28/22   Certification date from 10/28/22   Certification date to 11/04/22   Medical Diagnosis s/p R TKA   Physical Therapy Goals   PT Frequency 2x/day   PT Predicted Duration/Target Date for Goal Attainment 10/29/22   PT Goals Transfers;Gait;Stairs;PT Goal 1   PT: Transfers Modified independent;Sit to/from stand;Assistive device   PT: Gait Modified independent;150 feet;Rolling walker   PT: Stairs Supervision/stand-by assist;10 stairs;Rail on left;Rail on right   PT: Goal 1 Pt will demonstrate R TKA HEP with handout independently.   PT Discharge Planning   PT Plan gait with rw, issue rw for home, 10 steps, TKA HEP review   PT Discharge Recommendation (DC Rec) home with outpatient physical therapy   PT Brief overview of current status amb. 80 ft with rw cga   Total Session Time   Total Session Time (sum of timed and untimed services) 10   [facility]  OUTPATIENT PHYSICAL THERAPY EVALUATION  PLAN OF TREATMENT FOR OUTPATIENT REHABILITATION  (COMPLETE FOR INITIAL CLAIMS ONLY)  Patient's Last Name, First Name, M.I.  YOB: 1970  Aurelio Batista                         Provider's Name  [facility] Medical Record No.  [id number]                             Onset Date:  10/28/22   Start of Care Date:  10/28/22   Type:     _X_PT   ___OT   ___SLP Medical Diagnosis:  s/p R TKA              PT Diagnosis:  impaired functional mobility Visits from SOC:  1     See note for plan of treatment, functional goals and certification details    I CERTIFY THE NEED FOR THESE SERVICES FURNISHED UNDER        THIS PLAN OF TREATMENT AND WHILE UNDER MY CARE     (Physician co-signature of this document indicates review and certification of the therapy plan).

## 2022-10-28 NOTE — CONSULTS
"[facility]  Consult Note - Hospitalist Service  Date of Admission:  10/28/2022  Consult Requested by: Hunter Laguna MD  Reason for Consult: Medical management of DM-2    Assessment & Plan   Diabetes mellitus type 2.  Blood glucose fairly controlled.  Plan:  -Low carbohydrate diet  -We will continue with metformin and Farxiga.  -Insulin sliding scale  -Hypoglycemic protocol in place  Hyperlipidemia.  Stable.  Continue statin  GERD.  Stable.  Continue with PPI  Right knee osteoarthritis status post right knee arthroscopy.  -Pain control at this time.  -PT OT.  -Fall precautions.  -Continue pain management per orthopedics.  -Appreciate orthopedics recommendations.  -Discharge planning per orthopedics.    The patient's care was discussed with the Bedside Nurse and Patient.    Arben Larsen MD  [facility]  Securely message with the Vocera Web Console (learn more here)  Text page via [employer] Paging/Directory       Hospitalist Service    Clinically Significant Risk Factors Present on Admission                      # Obesity: Estimated body mass index is 39.95 kg/m  as calculated from the following:    Height as of this encounter: 1.778 m (5' 10\").    Weight as of this encounter: 126.3 kg (278 lb 6.4 oz).           ______________________________________________________________________    Chief Complaint   Consultation for medical management of diabetes and hyperlipidemia status post right knee arthroplasty    History is obtained from the patient    History of Present Illness   Aurelio Carocally Barnes. is a 51 year old male who is known to have severe osteoarthritis status post right knee arthroplasty 10/28.  We are consulted by orthopedics  for medical management of the above-mentioned problems.  Patient reports postoperatively he is doing well.  Pain controlled. He states he recently got diagnosed with diabetes and blood glucose have been fairly controlled at home.  " Reports no new complaints at this time.      Review of Systems   The 10 point Review of Systems is negative other than noted in the HPI    Past Medical History    I have reviewed this patient's medical history and updated it with pertinent information if needed.   Past Medical History:   Diagnosis Date     Arthritis      Diabetes (H)      Gastroesophageal reflux disease      Hypertension      Obese      PONV (postoperative nausea and vomiting)        Past Surgical History   I have reviewed this patient's surgical history and updated it with pertinent information if needed.  Past Surgical History:   Procedure Laterality Date     ANTERIOR CRUCIATE LIGAMENT REPAIR Left      APPENDECTOMY            HC KNEE SCOPE,SINGLE MENISECTOMY Right 11/21/2016    Procedure: RIGHT KNEE ARTHROPSCOPY WITH PARTIAL MEDIAL AND LATERAL MENISECTOMY, ABRASION CHONDROPLASTY PATELLOFEMORAL AND REMOVAL LOOSE BODY;  Surgeon: David Garcia MD;  Location: [facility];  Service: Orthopedics     INGUINAL HERNIA REPAIR Bilateral      LAPAROSCOPIC CHOLECYSTECTOMY N/A 3/25/2015    Procedure: CHOLECYSTECTOMY LAPAROSCOPIC;  Surgeon: Yoandy Ventura MD;  Location: [facility];  Service:      MENISCECTOMY Right 2016     PILONIDAL CYST DRAINAGE            VASECTOMY              Social History   I have reviewed this patient's social history and updated it with pertinent information if needed.  Social History     Tobacco Use     Smoking status: Never     Smokeless tobacco: Never   Substance Use Topics     Alcohol use: Not Currently     Drug use: Not Currently       Family History   I have reviewed this patient's family history and updated it with pertinent information if needed.  Family History   Problem Relation Age of Onset     Hypertension Mother      Hypertension Father      Cancer Maternal Grandfather      Cancer Paternal Grandfather      Diabetes No family hx of      Glaucoma No family hx of      Macular Degeneration No family hx of   "    Cerebrovascular Disease No family hx of      Thyroid Disease No family hx of      Eye Surgery No family hx of      Anesthesia Reaction No family hx of      Retinal detachment No family hx of      Amblyopia No family hx of      Strabismus No family hx of      Nystagmus No family hx of      Glasses (<7 y/o) No family hx of      Hyperlipidemia Brother         Dx at 9     Esophageal Cancer Maternal Grandfather      Prostate Cancer Paternal Grandfather      Esophageal Cancer Maternal Uncle         Dx 60s     Esophageal Cancer Paternal Uncle      Breast Cancer Mother      No Known Problems Brother        Medications   I have reviewed this patient's current medications    Allergies   Allergies   Allergen Reactions     Codeine Nausea and Vomiting     GI upset         Physical Exam   Vital Signs: Temp: 97.3  F (36.3  C) Temp src: Oral BP: 128/79 Pulse: 58   Resp: 16 SpO2: 96 % O2 Device: None (Room air) Oxygen Delivery: 8 LPM  Weight: 278 lbs 6.4 oz  General: He is a well grown well-developed adult male lying in bed comfortably no distress  HEENT: Head is normocephalic atraumatic eyes pupils appear equal round and reactive to light conjunctive is moist and pink sclera anicteric.  Lungs: He has a normal respiratory effort and auscultation breath sounds are clear  Heart: He has a good S1 and S2 no obvious murmurs, no JVD peripheral pulses are palpable.  Abdomen: Soft nontender nondistended bowel sounds are noted no obvious organomegaly  Musculoskeletal: He has good muscle tone, clean dressing noted on his right knee is neurovascularly intact nails and digits appear acyanotic  Skin: No obvious rashes noted is warm to touch skin turgor appear normal.    Data   I personally reviewed   Results for orders placed or performed during the hospital encounter of 10/28/22 (from the past 24 hour(s))   POC US Guidance Needle Placement    Narrative    Ultrasound was performed as guidance to an anesthesia procedure.  Click   \"PACS " "images\" hyperlink below to view any stored images.  For specific   procedure details, view procedure note authored by anesthesia.   Glucose by meter   Result Value Ref Range    GLUCOSE BY METER POCT 114 (H) 70 - 99 mg/dL   Glucose by meter   Result Value Ref Range    GLUCOSE BY METER POCT 103 (H) 70 - 99 mg/dL     Most Recent 3 CBC's:No lab results found.  Most Recent 3 BMP's:Recent Labs   Lab Test 10/28/22  1147 10/28/22  0747 10/27/21  0752 11/13/20  0906 11/01/19  1625   NA  --   --  142 140 141   POTASSIUM  --   --  5.0 4.4 4.7   CHLORIDE  --   --  106 105 102   CO2  --   --  28 30 30   BUN  --   --  16 16 16   CR  --   --  0.93 1.00 0.96   ANIONGAP  --   --  8 5 9   CLINT  --   --  9.4 8.7 9.7   * 114* 107 116 92     "

## 2022-10-28 NOTE — ANESTHESIA CARE TRANSFER NOTE
Patient: Aurelio Batista     Procedure: Procedure(s):  RIGHT TOTAL KNEE ARTHROPLASTY       Diagnosis: Knee osteoarthritis [M17.9]  Diagnosis Additional Information: No value filed.    Anesthesia Type:   Spinal     Note:    Oropharynx: oropharynx clear of all foreign objects  Level of Consciousness: awake  Oxygen Supplementation: face mask  Level of Supplemental Oxygen (L/min / FiO2): 6  Independent Airway: airway patency satisfactory and stable  Dentition: dentition unchanged  Vital Signs Stable: post-procedure vital signs reviewed and stable  Report to RN Given: handoff report given  Patient transferred to: PACU    Handoff Report: Identifed the Patient, Identified the Reponsible Provider, Reviewed the pertinent medical history, Discussed the surgical course, Reviewed Intra-OP anesthesia mangement and issues during anesthesia, Set expectations for post-procedure period and Allowed opportunity for questions and acknowledgement of understanding      Vitals:  Vitals Value Taken Time   BP 94/53 10/28/22 1140   Temp 36.3  C (97.3  F) 10/28/22 1140   Pulse 63 10/28/22 1140   Resp 18 10/28/22 1140   SpO2 96 % 10/28/22 1140       Electronically Signed By: KAMALJIT Rose CRNA  October 28, 2022  11:41 AM

## 2022-10-29 ENCOUNTER — APPOINTMENT (OUTPATIENT)
Dept: OCCUPATIONAL THERAPY | Facility: CLINIC | Age: 52
End: 2022-10-29
Attending: ORTHOPAEDIC SURGERY
Payer: COMMERCIAL

## 2022-10-29 ENCOUNTER — APPOINTMENT (OUTPATIENT)
Dept: PHYSICAL THERAPY | Facility: CLINIC | Age: 52
End: 2022-10-29
Attending: ORTHOPAEDIC SURGERY
Payer: COMMERCIAL

## 2022-10-29 VITALS
BODY MASS INDEX: 39.86 KG/M2 | SYSTOLIC BLOOD PRESSURE: 143 MMHG | HEIGHT: 70 IN | HEART RATE: 63 BPM | TEMPERATURE: 98.1 F | RESPIRATION RATE: 16 BRPM | DIASTOLIC BLOOD PRESSURE: 72 MMHG | WEIGHT: 278.4 LBS | OXYGEN SATURATION: 94 %

## 2022-10-29 LAB
FASTING STATUS PATIENT QL REPORTED: YES
GLUCOSE BLD-MCNC: 127 MG/DL (ref 70–125)
HGB BLD-MCNC: 14.2 G/DL (ref 13.3–17.7)

## 2022-10-29 PROCEDURE — 250N000011 HC RX IP 250 OP 636: Performed by: ORTHOPAEDIC SURGERY

## 2022-10-29 PROCEDURE — 97110 THERAPEUTIC EXERCISES: CPT | Mod: GP

## 2022-10-29 PROCEDURE — 250N000013 HC RX MED GY IP 250 OP 250 PS 637: Performed by: HOSPITALIST

## 2022-10-29 PROCEDURE — 97535 SELF CARE MNGMENT TRAINING: CPT | Mod: GO

## 2022-10-29 PROCEDURE — 97166 OT EVAL MOD COMPLEX 45 MIN: CPT | Mod: GO

## 2022-10-29 PROCEDURE — 99215 OFFICE O/P EST HI 40 MIN: CPT | Performed by: HOSPITALIST

## 2022-10-29 PROCEDURE — 82947 ASSAY GLUCOSE BLOOD QUANT: CPT | Performed by: ORTHOPAEDIC SURGERY

## 2022-10-29 PROCEDURE — 85018 HEMOGLOBIN: CPT | Performed by: ORTHOPAEDIC SURGERY

## 2022-10-29 PROCEDURE — 97116 GAIT TRAINING THERAPY: CPT | Mod: GP

## 2022-10-29 PROCEDURE — 250N000013 HC RX MED GY IP 250 OP 250 PS 637: Performed by: ORTHOPAEDIC SURGERY

## 2022-10-29 PROCEDURE — 36415 COLL VENOUS BLD VENIPUNCTURE: CPT | Performed by: ORTHOPAEDIC SURGERY

## 2022-10-29 RX ADMIN — METFORMIN HYDROCHLORIDE 500 MG: 500 TABLET ORAL at 09:44

## 2022-10-29 RX ADMIN — ROSUVASTATIN CALCIUM 40 MG: 10 TABLET, FILM COATED ORAL at 09:44

## 2022-10-29 RX ADMIN — ASPIRIN 81 MG: 81 TABLET, COATED ORAL at 09:44

## 2022-10-29 RX ADMIN — POLYETHYLENE GLYCOL 3350 17 G: 17 POWDER, FOR SOLUTION ORAL at 09:44

## 2022-10-29 RX ADMIN — ACETAMINOPHEN 975 MG: 325 TABLET, FILM COATED ORAL at 06:31

## 2022-10-29 RX ADMIN — CEFAZOLIN SODIUM 2 G: 2 INJECTION, SOLUTION INTRAVENOUS at 02:46

## 2022-10-29 RX ADMIN — DAPAGLIFLOZIN 10 MG: 5 TABLET, FILM COATED ORAL at 09:44

## 2022-10-29 RX ADMIN — PANTOPRAZOLE SODIUM 40 MG: 20 TABLET, DELAYED RELEASE ORAL at 06:31

## 2022-10-29 RX ADMIN — SENNOSIDES AND DOCUSATE SODIUM 1 TABLET: 50; 8.6 TABLET ORAL at 09:44

## 2022-10-29 ASSESSMENT — ACTIVITIES OF DAILY LIVING (ADL)
ADLS_ACUITY_SCORE: 25

## 2022-10-29 NOTE — PROGRESS NOTES
[facility]    Medicine Progress Note - Hospitalist Service    Date of Admission:  10/28/2022    Assessment & Plan   Diabetes mellitus type 2.  Blood glucose fairly controlled.  Plan:  -Low carbohydrate diet  -We will continue with metformin and Farxiga.  -Insulin sliding scale  -Hypoglycemic protocol in place  Hyperlipidemia.  Stable.  Continue statin    GERD.  Stable.  Continue with PPI  Right knee osteoarthritis status post right knee arthroscopy.  -Pain control at this time.  -PT OT.  -Fall precautions.  -Continue pain management per orthopedics.  -Appreciate orthopedics recommendations.  -Discharge planning per orthopedics.    S/p R TKA  Knee OA  Acute Post-Op Pain  -Agree with current pain control regimen; consider acute pain team consultation if pain becomes increasingly difficult to control or proves refractory.   -PT evaluation.   -OT evaluation.  -IS frequently, initially every hour while awake as tolerated. Directly encouraged and discussed.      Post-Op DVT Prophylaxis  -As per primary surgery team.    The patient's care was discussed with the Bedside Nurse and Patient.    >40 mins with 50% of the floor time (for inpatient hospital services) spent providing counseling or coordination of care (C/CC).  This includes stabilizing the patient s glucose levels and, stabilizing the patient's hemodynamics including blood pressure and heart rate, formulating the appropriate care plan for today, and also extensive counseling regarding disease management, lifestyle modifications, and medication regimen with the patient and/or caregivers. Coordination of care for outpatient programs and resources is also crucial and discussed with patient and/or caregivers and completed updated medication reconciliation for safe discharge.    Ron Rodriguez MD  Internal Medicine  Hospitalist  [facility]  Phone: #[phone]       Diet: Advance Diet as Tolerated: Regular Diet  "Adult  Discharge Instruction - Regular Diet Adult    DVT Prophylaxis: Defer to primary service  Siu Catheter: Not present  Central Lines: None  Cardiac Monitoring: None  Code Status: Full Code      Disposition Plan      Expected Discharge Date: 10/29/2022,  9:00 AM    Destination: home with family          The patient's care was discussed with the Patient.    Ron Rodriguez MD  Hospitalist Service  [facility]  Securely message with the Vocera Web Console (learn more here)  Text page via [employer] Paging/Directory         Clinically Significant Risk Factors Present on Admission                      # Obesity: Estimated body mass index is 39.95 kg/m  as calculated from the following:    Height as of this encounter: 1.778 m (5' 10\").    Weight as of this encounter: 126.3 kg (278 lb 6.4 oz).           ______________________________________________________________________    Interval History   No acute events overnight.    His medications and pain meds have been delayed per patient. I let him know that our nursing teams are short-staffed and everyone is doing the best they can and I will check in with nursing as soon as I can. Pain is 5 out of 10, not to patient satisfaction. No report of chest pain, shortness of breath, or other new complaints. Discussed plan of care with patient. Answered all questions to patient's verbalized understanding and satisfaction. Medication reconciliation completed for anticipated discharge and discussed with patient as well as counseling regarding lifestyle modifications and behaviors in setting of post-operative recovery in the coming weeks, outpatient rehabilitative follow-up plan pending final PT/OT recommendations, and follow-up in clinic with the primary care provider and with surgery as scheduled. All questions were answered to stated and verbalized satisfaction.       Data reviewed today: I reviewed all medications, new labs and imaging results over the last " 24 hours. I personally reviewed .    Physical Exam   Vital Signs: Temp: 98.1  F (36.7  C) Temp src: Oral BP: (!) 143/72 Pulse: 63   Resp: 16 SpO2: 94 % O2 Device: None (Room air) Oxygen Delivery: 8 LPM  Weight: 278 lbs 6.4 oz  GENERAL:  Alert, appears comfortable, in no acute distress, appears stated age   HEAD:  Normocephalic, without obvious abnormality, atraumatic   EYES:  PERRL, conjunctiva/corneas clear, no scleral icterus, EOM's intact   NOSE: Nares normal, septum midline, mucosa normal, no drainage   THROAT: Lips, mucosa, and tongue normal; teeth and gums normal, mouth moist   NECK: Supple, symmetrical, trachea midline   BACK:   Symmetric, no curvature, ROM normal   LUNGS:   Clear to auscultation bilaterally, no rales, rhonchi, or wheezing, symmetric chest rise on inhalation, respirations unlabored   CHEST WALL:  No tenderness or deformity   HEART:  Regular rate and rhythm, S1 and S2 normal, no murmur, rub, or gallop    ABDOMEN:   Soft, non-tender, bowel sounds active all four quadrants, no masses, no organomegaly, no rebound or guarding   EXTREMITIES: Extremities normal, atraumatic, no cyanosis or edema    SKIN: Dry to touch, no exanthems in the visualized areas   NEURO: Alert, oriented x 4, moves all four extremities freely/spontaneously   PSYCH: Cooperative, behavior is appropriate        Data   Recent Labs   Lab 10/29/22  0535 10/28/22  1147 10/28/22  0747   HGB 14.2  --   --    * 103* 114*     No results found for this or any previous visit (from the past 24 hour(s)).  Medications     lactated ringers 75 mL/hr at 10/28/22 1746       acetaminophen  975 mg Oral Q8H     aspirin  81 mg Oral BID     dapagliflozin  10 mg Oral Daily     metFORMIN  500 mg Oral Daily with breakfast     pantoprazole  40 mg Oral QAM AC     polyethylene glycol  17 g Oral Daily     rosuvastatin  40 mg Oral Daily     senna-docusate  1 tablet Oral BID     sodium chloride (PF)  3 mL Intracatheter Q8H

## 2022-10-29 NOTE — PLAN OF CARE
"  Problem: Plan of Care - These are the overarching goals to be used throughout the patient stay.    Goal: Readiness for Transition of Care  Outcome: Progressing   Goal Outcome Evaluation:                      /62 (BP Location: Left arm)   Pulse 55   Temp 97.8  F (36.6  C) (Oral)   Resp 14   Ht 1.778 m (5' 10\")   Wt 126.3 kg (278 lb 6.4 oz)   SpO2 93%   BMI 39.95 kg/m      Patient vital signs are at baseline: Yes  Patient able to ambulate as they were prior to admission or with assist devices provided by therapies during their stay:  Yes  Patient MUST void prior to discharge:  Yes  Patient able to tolerate oral intake:  Yes  Pain has adequate pain control using Oral analgesics:  Yes  Does patient have an identified :  Yes  Has goal D/C date and time been discussed with patient:  Yes    David French RN      "

## 2022-10-29 NOTE — PROGRESS NOTES
10/29/22 0731   Appointment Info   Signing Clinician's Name / Credentials (OT) GEORGE Robert   Quick Adds   Quick Adds Certification   Living Environment   People in Home spouse   Current Living Arrangements house   Transportation Anticipated family or friend will provide   Living Environment Comments Pt has crutches, walkin shower, standard toilet   Self-Care   Usual Activity Tolerance good   Current Activity Tolerance good   Fall history within last six months no   Activity/Exercise/Self-Care Comment Pt IND w/ ADLs and IADLs at baseline   General Information   Onset of Illness/Injury or Date of Surgery 10/28/22   Referring Physician Hunter Laguna MD   Additional Occupational Profile Info/Pertinent History of Current Problem TKA   Existing Precautions/Restrictions no known precautions/restrictions   Left Lower Extremity (Weight-bearing Status) full weight-bearing (FWB)   Right Lower Extremity (Weight-bearing Status) weight-bearing as tolerated (WBAT)   Cognitive Status Examination   Orientation Status orientation to person, place and time   Visual Perception   Visual Impairment/Limitations corrective lenses full-time   Sensory   Sensory Quick Adds sensation intact   Pain Assessment   Patient Currently in Pain Yes, see Vital Sign flowsheet   Posture   Posture not impaired   Range of Motion Comprehensive   General Range of Motion no range of motion deficits identified   Strength Comprehensive (MMT)   General Manual Muscle Testing (MMT) Assessment no strength deficits identified   Muscle Tone Assessment   Muscle Tone Quick Adds No deficits were identified   Coordination   Upper Extremity Coordination No deficits were identified   Bed Mobility   Bed Mobility supine-sit;sit-supine   Comment (Bed Mobility) SBA for bed mobility   Transfers   Transfers bed-chair transfer;sit-stand transfer;toilet transfer;shower transfer   Transfer Comments SBA for transfers   Activities of Daily Living   BADL  Assessment/Intervention lower body dressing;toileting   Lower Body Dressing Assessment/Training   Morton Level (Lower Body Dressing) minimum assist (75% patient effort)   Toileting   Morton Level (Toileting) supervision   Clinical Impression   Criteria for Skilled Therapeutic Interventions Met (OT) Yes, treatment indicated   OT Diagnosis decreased ADLs   Influenced by the following impairments TKA   OT Problem List-Impairments impacting ADL activity tolerance impaired;mobility;pain   Assessment of Occupational Performance 3-5 Performance Deficits   Identified Performance Deficits LE dressing, bed mobility, trasnfers   Planned Therapy Interventions (OT) ADL retraining;bed mobility training;transfer training   Clinical Decision Making Complexity (OT) moderate complexity   Risk & Benefits of therapy have been explained evaluation/treatment results reviewed;patient   OT Total Evaluation Time   OT Eval, Moderate Complexity Minutes (54947) 10   Therapy Certification   Medical Diagnosis TKA   Start of Care Date 10/29/22   Certification date from 10/29/22   Certification date to 11/28/22   OT Goals   Therapy Frequency (OT) One time eval and treatment   OT Predicted Duration/Target Date for Goal Attainment 10/29/22   OT Goals Lower Body Dressing;Bed Mobility;Transfers   OT: Lower Body Dressing Modified independent;Goal Met;Completed   OT: Bed Mobility Modified independent;supine to/from sitting;rolling;bridging;Goal Met;Completed   OT: Transfer Supervision/stand-by assist;with assistive device;Goal Met;Completed  (walkin shower)   Interventions   Interventions Quick Adds Self-Care/Home Management   Self-Care/Home Management   Self-Care/Home Mgmt/ADL, Compensatory, Meal Prep Minutes (85691) 18   Symptoms Noted During/After Treatment (Meal Preparation/Planning Training) increased pain   Treatment Detail/Skilled Intervention Pt edu on compensatory strategies for LE dressing - completed Mod I. Sit<>stand w/ SBA and  amb. 10 ft to BR w/ fWW Mod I. Pt edu on safety technique for standard toilet/walkin shower transfer - completed each Mod I. Pt instructed on bed mobility techniques - completed Mod I. Edu on sleeping position and car transfers - pt verbalized understanding   OT Discharge Planning   OT Plan DC OT   OT Discharge Recommendation (DC Rec) (S)  home with assist   OT Rationale for DC Rec Pt tolerating therapy well. Pt declines DME and states he will fine at home. Pt will be home alone during the days due to spouse and child being gone at school during days.   OT Brief overview of current status Pt Mod I for all ADLs   Total Session Time   Timed Code Treatment Minutes 18   Total Session Time (sum of timed and untimed services) 28      [facility]  OUTPATIENT OCCUPATIONAL THERAPY  EVALUATION  PLAN OF TREATMENT FOR OUTPATIENT REHABILITATION  (COMPLETE FOR INITIAL CLAIMS ONLY)  Patient's Last Name, First Name, M.I.  YOB: 1970  Aurelio Batista                          Provider's Name  [facility] Medical Record No.  [id number]                             Onset Date:  10/28/22   Start of Care Date:  10/29/22   Type:     ___PT   _X_OT   ___SLP Medical Diagnosis:  TKA                    OT Diagnosis:  decreased ADLs Visits from SOC:  1     See note for plan of treatment, functional goals and certification details    I CERTIFY THE NEED FOR THESE SERVICES FURNISHED UNDER        THIS PLAN OF TREATMENT AND WHILE UNDER MY CARE     (Physician co-signature of this document indicates review and certification of the therapy plan).

## 2022-10-29 NOTE — PLAN OF CARE
Occupational Therapy Discharge Summary    Reason for therapy discharge:    All goals and outcomes met, no further needs identified.    Progress towards therapy goal(s). See goals on Care Plan in [facility] electronic health record for goal details.  Goals met    Therapy recommendation(s):    No further therapy is recommended.

## 2022-10-29 NOTE — PLAN OF CARE
Patient vital signs are at baseline: Yes  Patient able to ambulate as they were prior to admission or with assist devices provided by therapies during their stay:  Yes - ax1 with gait belt and walker. Ambulated in [place] x2.  Patient MUST void prior to discharge:  Yes  Patient able to tolerate oral intake:  Yes  Pain has adequate pain control using Oral analgesics:  Yes - PRN Oxycodone  Does patient have an identified :  Yes  Has goal D/C date and time been discussed with patient:  Yes - pt plans to discharge home tomorrow

## 2022-10-29 NOTE — PROGRESS NOTES
"POD 1 RIGHT TKA    NO ISSUES  /62 (BP Location: Left arm)   Pulse 55   Temp 97.8  F (36.6  C) (Oral)   Resp 14   Ht 1.778 m (5' 10\")   Wt 126.3 kg (278 lb 6.4 oz)   SpO2 93%   BMI 39.95 kg/m      DRSG DRY  CMS STABLE    PLAN - discharge TODAY  "

## 2022-10-29 NOTE — PROGRESS NOTES
Physical Therapy Discharge Summary    Reason for therapy discharge:    All goals and outcomes met, no further needs identified.    Progress towards therapy goal(s). See goals on Care Plan in [facility] electronic health record for goal details.  Goals met    Therapy recommendation(s):    Pt has OP PT already set up.

## 2023-11-17 NOTE — TELEPHONE ENCOUNTER
[facility] Call Center    Phone Message    May a detailed message be left on voicemail: yes    Reason for Call: Other: per nitin from [facility]- needs glasses rx faxed over to [phone] thanks     Action Taken: Message routed to:  [facility] eye clinic   No.

## (undated) DEVICE — SOL NACL 0.9% IRRIG 1000ML BOTTLE 2F7124

## (undated) DEVICE — GLOVE BIOGEL PI INDICATOR 8.0 LF 41680

## (undated) DEVICE — SU MONOCRYL 3-0 PS-2 18" UND MCP497G

## (undated) DEVICE — DRAPE SHEET REV FOLD 3/4 9349

## (undated) DEVICE — DECANTER VIAL 2006S

## (undated) DEVICE — CUSTOM PACK TOTAL KNEE ACCESSORY SOP5BTAHEA

## (undated) DEVICE — CUSTOM PACK TOTAL KNEE SOP5BTKHEC

## (undated) DEVICE — BLADE SAW SAGITTAL STRK DUAL CUT 4118-135-090

## (undated) DEVICE — SUTURE VICRYL+ 2-0 27IN CT-1 UND VCP259H

## (undated) DEVICE — SU STRATAFIX PDS PLUS 1 CT-1 18" SXPP1A404

## (undated) DEVICE — DRESSING MEPILEX AG SILVER 4X12 395990

## (undated) DEVICE — GLOVE BIOGEL PI ULTRATOUCH G SZ 8.5 42185

## (undated) DEVICE — ADH SKIN CLOSURE PREMIERPRO EXOFIN 1.0ML 3470

## (undated) DEVICE — GLOVE UNDER INDICATOR PI SZ 8.5 LF 41685

## (undated) DEVICE — SUTURE VICRYL+ 2 27IN CP UNDYED VCP195H

## (undated) DEVICE — PLATE GROUNDING ADULT W/CORD 9165L

## (undated) DEVICE — HOLDER LIMB VELCRO OR 0814-1533

## (undated) DEVICE — A3 SUPPLIES- SEE NURSING INFO PAGE

## (undated) DEVICE — SOL NACL 0.9% INJ 1000ML BAG 2B1324X

## (undated) DEVICE — SUCTION MANIFOLD NEPTUNE 2 SYS 4 PORT 0702-020-000

## (undated) DEVICE — GLOVE BIOGEL PI ULTRATOUCH G SZ 8.0 42180

## (undated) DEVICE — SOL WATER IRRIG 1000ML BOTTLE 2F7114

## (undated) DEVICE — SUTURE VICRYL+ 1 27IN CT-1 UND VCP261H

## (undated) DEVICE — BANDAGE ELASTIC 6X550 LF DBL 593-96LF

## (undated) RX ORDER — LIDOCAINE HYDROCHLORIDE 10 MG/ML
INJECTION, SOLUTION EPIDURAL; INFILTRATION; INTRACAUDAL; PERINEURAL
Status: DISPENSED
Start: 2022-10-28

## (undated) RX ORDER — ONDANSETRON 2 MG/ML
INJECTION INTRAMUSCULAR; INTRAVENOUS
Status: DISPENSED
Start: 2022-10-28

## (undated) RX ORDER — PROPOFOL 10 MG/ML
INJECTION, EMULSION INTRAVENOUS
Status: DISPENSED
Start: 2022-10-28

## (undated) RX ORDER — DEXAMETHASONE SODIUM PHOSPHATE 4 MG/ML
INJECTION, SOLUTION INTRA-ARTICULAR; INTRALESIONAL; INTRAMUSCULAR; INTRAVENOUS; SOFT TISSUE
Status: DISPENSED
Start: 2022-10-28